# Patient Record
Sex: FEMALE | HISPANIC OR LATINO | Employment: OTHER | URBAN - METROPOLITAN AREA
[De-identification: names, ages, dates, MRNs, and addresses within clinical notes are randomized per-mention and may not be internally consistent; named-entity substitution may affect disease eponyms.]

---

## 2023-01-10 ENCOUNTER — HOSPITAL ENCOUNTER (EMERGENCY)
Facility: CLINIC | Age: 73
Discharge: HOME | End: 2023-01-10
Attending: EMERGENCY MEDICINE | Admitting: EMERGENCY MEDICINE
Payer: COMMERCIAL

## 2023-01-10 VITALS
HEART RATE: 72 BPM | SYSTOLIC BLOOD PRESSURE: 150 MMHG | OXYGEN SATURATION: 99 % | DIASTOLIC BLOOD PRESSURE: 76 MMHG | RESPIRATION RATE: 16 BRPM | TEMPERATURE: 98.6 F

## 2023-01-10 DIAGNOSIS — H43.12 VITREOUS HEMORRHAGE, LEFT EYE (H): ICD-10-CM

## 2023-01-10 PROCEDURE — 99283 EMERGENCY DEPT VISIT LOW MDM: CPT | Performed by: EMERGENCY MEDICINE

## 2023-01-10 PROCEDURE — 99282 EMERGENCY DEPT VISIT SF MDM: CPT | Performed by: EMERGENCY MEDICINE

## 2023-01-10 ASSESSMENT — ACTIVITIES OF DAILY LIVING (ADL): ADLS_ACUITY_SCORE: 35

## 2023-01-11 ENCOUNTER — MEDICAL CORRESPONDENCE (OUTPATIENT)
Dept: HEALTH INFORMATION MANAGEMENT | Facility: CLINIC | Age: 73
End: 2023-01-11

## 2023-01-11 ENCOUNTER — OFFICE VISIT (OUTPATIENT)
Dept: OPHTHALMOLOGY | Facility: CLINIC | Age: 73
End: 2023-01-11
Attending: OPHTHALMOLOGY
Payer: COMMERCIAL

## 2023-01-11 ENCOUNTER — TELEPHONE (OUTPATIENT)
Dept: OPHTHALMOLOGY | Facility: CLINIC | Age: 73
End: 2023-01-11

## 2023-01-11 ENCOUNTER — TRANSFERRED RECORDS (OUTPATIENT)
Dept: OPHTHALMOLOGY | Facility: CLINIC | Age: 73
End: 2023-01-11

## 2023-01-11 ENCOUNTER — HOSPITAL ENCOUNTER (OUTPATIENT)
Facility: AMBULATORY SURGERY CENTER | Age: 73
End: 2023-01-11
Attending: OPHTHALMOLOGY
Payer: COMMERCIAL

## 2023-01-11 DIAGNOSIS — H43.12 VITREOUS HEMORRHAGE OF LEFT EYE (H): Primary | ICD-10-CM

## 2023-01-11 DIAGNOSIS — H43.12 VITREOUS HEMORRHAGE OF LEFT EYE (H): ICD-10-CM

## 2023-01-11 PROBLEM — E11.69 DIABETES MELLITUS TYPE 2 IN OBESE: Status: ACTIVE | Noted: 2021-02-16

## 2023-01-11 PROBLEM — M17.9 OA (OSTEOARTHRITIS) OF KNEE: Status: ACTIVE | Noted: 2020-01-13

## 2023-01-11 PROBLEM — E66.9 DIABETES MELLITUS TYPE 2 IN OBESE: Status: ACTIVE | Noted: 2021-02-16

## 2023-01-11 PROBLEM — H34.8392 BRVO (BRANCH RETINAL VEIN OCCLUSION) (H): Status: ACTIVE | Noted: 2017-05-02

## 2023-01-11 PROCEDURE — G0463 HOSPITAL OUTPT CLINIC VISIT: HCPCS | Mod: 25

## 2023-01-11 PROCEDURE — 76512 OPH US DX B-SCAN: CPT | Performed by: OPHTHALMOLOGY

## 2023-01-11 PROCEDURE — 99204 OFFICE O/P NEW MOD 45 MIN: CPT | Mod: GC | Performed by: OPHTHALMOLOGY

## 2023-01-11 RX ORDER — RIVAROXABAN 20 MG/1
TABLET, FILM COATED ORAL
COMMUNITY
Start: 2022-12-30

## 2023-01-11 ASSESSMENT — CONF VISUAL FIELD
METHOD: COUNTING FINGERS
OD_INFERIOR_TEMPORAL_RESTRICTION: 0
OD_NORMAL: 1
OD_SUPERIOR_TEMPORAL_RESTRICTION: 0
OD_INFERIOR_NASAL_RESTRICTION: 0
OD_SUPERIOR_NASAL_RESTRICTION: 0
OS_INFERIOR_NASAL_RESTRICTION: 3
OS_SUPERIOR_NASAL_RESTRICTION: 3

## 2023-01-11 ASSESSMENT — EXTERNAL EXAM - LEFT EYE: OS_EXAM: NORMAL

## 2023-01-11 ASSESSMENT — VISUAL ACUITY
CORRECTION_TYPE: GLASSES
OD_CC: 20/40
OS_CC: CF @3 FT
OD_CC+: +1
METHOD: SNELLEN - LINEAR

## 2023-01-11 ASSESSMENT — EXTERNAL EXAM - RIGHT EYE: OD_EXAM: NORMAL

## 2023-01-11 ASSESSMENT — TONOMETRY
OD_IOP_MMHG: 13
OS_IOP_MMHG: 15
IOP_METHOD: TONOPEN

## 2023-01-11 ASSESSMENT — CUP TO DISC RATIO: OD_RATIO: 0.4

## 2023-01-11 ASSESSMENT — SLIT LAMP EXAM - LIDS
COMMENTS: NORMAL
COMMENTS: NORMAL

## 2023-01-11 ASSESSMENT — ENCOUNTER SYMPTOMS: JOINT SWELLING: 1

## 2023-01-11 NOTE — CONSULTS
OPHTHALMOLOGY CONSULT NOTE  01/10/23    Patient: Dottie Wilhelm      ASSESSMENT/PLAN:     Dottie Wilhelm is a 72 year old female who presented with sudden decreased vision left eye:     # Vitreous Hemorrhage, left eye  - VA CF 3', no APD  - B scan revealed vitreous hemorrhage with likely superior bleeding vessel, no clear retinal detachment  - DDx: right eye with no evidence of DR so PDR unlikely, does have a h/o BRVO left eye in the past - possibly NVE, most likely vitreous hemorrhage due to acute PVD with ?associated retinal tear  - follow-up retina clinic tomorrow for repeat exam    #H/o BRVO left eye c/b macular edema   - s/p 1 intravitreal injection left eye  - baseline VA unknown - records unavailable    # H/o DM  - controlled on diet - last A1c in 6-7 range  - no DR right eye     # Cataracts each eye   - follows with outside ophthalmologist     It is our pleasure to participate in this patient's care and treatment. Please contact us with any further questions or concerns.    Discussed with Dr Leonard who agreed with this assessment and plan.     Vannessa Mackenzie MD     HISTORY OF PRESENTING ILLNESS:     Dottie Wilhelm is a 72 year old female who presented on 01/10/23 with sudden decreased vision left eye this morning. Possibly had associated flashes this morning but she doesn't know for sure. Initially seen at outside ED transferred here for further care.    No curtain coming down on vision, GCA ROS negative.       10+ review of systems were otherwise negative except for that which has been stated above.      OCULAR/MEDICAL/SURGICAL HISTORIES:     Past Ocular History:   2017 - BRVO with macular edema left eye s/p 1 injection  2018 - floaters left eye     Eye Drops:   None    Pertinent Systemic Medications:   Xarelto (started 5 years ago)    Past Medical History:  Pulmonary embolism 1975  H/o osteomeylitis right thigh 1975  H/o breast cancer 6/2011  DVT 12/2016    Past Surgical History:   Cholecystectomy  1970  Inferior vena cava ligation and left illiofemoral venous thrombectomy  Phlebitis    Family History:  Sister with congenital glaucoma    Social History:  Born in [facility]    EXAMINATION:     Base Eye Exam     Visual Acuity (Snellen - Linear)       Right Left    Dist cc 20/20 CF 3'          Tonometry (Tonopen, 9:13 PM)       Right Left    Pressure 20 21          Pupils       Pupils APD    Right PERRL None    Left PERRL None          Visual Fields (Counting fingers)       Left Right     Full Full          Extraocular Movement       Right Left     Full, Ortho Full, Ortho          Neuro/Psych     Oriented x3: Yes    Mood/Affect: Normal          Dilation     Both eyes: 1.0% Mydriacyl, 2.5% Jewel Synephrine @ 9:12 PM            Additional Tests     Color       Right Left    Ishihara 11/11 UA            Slit Lamp and Fundus Exam     External Exam       Right Left    External Normal Normal          Slit Lamp Exam       Right Left    Lids/Lashes Normal Normal    Conjunctiva/Sclera White and quiet White and quiet    Cornea Clear Clear    Anterior Chamber Deep and quiet Deep and quiet    Iris Round and reactive Round and reactive    Lens NS NS    Anterior Vitreous Normal 3+ Vitreous hemorrhage          Fundus Exam       Right Left    Disc Normal no view    C/D Ratio 0.4     Macula Normal     Vessels Normal     Periphery Normal                 Labs/Studies/Imaging Performed  B scan:  - vit heme with likely hemorrhage coming from superior vessel, no obvious RD       Vannessa Mackenzie MD  Resident Physician, PGY-3  Department of Ophthalmology  01/10/23 9:06 PM   Pager: [phone]

## 2023-01-11 NOTE — DISCHARGE INSTRUCTIONS
Follow-up with the eye clinic tomorrow.    Eye Clinic (phone: [phone]).    Return to the emergency department if any concerns.

## 2023-01-11 NOTE — ED NOTES
Bed: ED04  Expected date:   Expected time:   Means of arrival:   Comments:  Hold for HW D when clean

## 2023-01-11 NOTE — ED NOTES
Bed: IN01  Expected date:   Expected time:   Means of arrival:   Comments:  [id number]- Senait Boone 73 y/o female- left retinal detachment from [facility].     Chandu Sotelo MD  01/1950

## 2023-01-11 NOTE — ED PROVIDER NOTES
ED Provider Note  [facility]      History     Chief Complaint   Patient presents with     Eye Problem     Woke up with blurry vision. Seen at [place]. Transferred here for eye evaluation. Reports has diagnosis of macular degeneration and has seen floaters and webs in her vision in the past.     HPI  Dottie Wilhelm is a 72 year old female who presents to the [facility] emergency department via EMS as a transfer from [place] emergency department where she was seen for decreased vision in her left eye that began this morning.  She was found to have decreased visual acuity in her left eye and only able to see movement.  The patient had an ocular ultrasound performed which was concerning for retinal detachment.  After discussion with ophthalmology here, patient was transferred to the [facility] emergency department for specialty care.  Patient denies any difficulty with her right eye.  No weakness.  No speech difficulty.  No difficulty talking.  No headache.    Past Medical History  Past Medical History:   Diagnosis Date     Arthritis      Breast cancer (H)      Diabetes mellitus (H) 2019     Phlebitis      Pulmonary embolism (H)      No past surgical history on file.  XARELTO ANTICOAGULANT 20 MG TABS tablet      Allergies   Allergen Reactions     Oxycodone Rash     Penicillins Rash     Family History  Family History   Problem Relation Age of Onset     Glaucoma Sister      Social History   Social History     Tobacco Use     Smoking status: Former     Types: Cigarettes     Quit date:      Years since quittin.0     Smokeless tobacco: Never         A medically appropriate review of systems was performed with pertinent positives and negatives noted in the HPI, and all other systems negative.    Physical Exam   BP: (!) 150/76  Pulse: 72  Temp: 98.6  F (37  C)  Resp: 16  SpO2: 99 %  Physical Exam  Vitals and nursing note reviewed.   Constitutional:       General: She is not in acute  distress.     Appearance: Normal appearance. She is not diaphoretic.   HENT:      Head: Normocephalic and atraumatic.      Mouth/Throat:      Pharynx: No oropharyngeal exudate.   Eyes:      General: No scleral icterus.     Comments: Visual field full in right eye.   Cardiovascular:      Rate and Rhythm: Normal rate and regular rhythm.      Pulses: Normal pulses.      Heart sounds: Normal heart sounds.   Pulmonary:      Effort: No respiratory distress.      Breath sounds: Normal breath sounds.   Abdominal:      General: Bowel sounds are normal.      Palpations: Abdomen is soft.      Tenderness: There is no abdominal tenderness.   Musculoskeletal:         General: No tenderness. Normal range of motion.   Skin:     General: Skin is warm.      Capillary Refill: Capillary refill takes less than 2 seconds.      Findings: No rash.   Neurological:      General: No focal deficit present.      Mental Status: She is alert.      Cranial Nerves: No cranial nerve deficit.      Motor: No weakness.      Coordination: Coordination normal.   Psychiatric:         Mood and Affect: Mood normal.           ED Course, Procedures, & Data      Procedures                      No results found for any visits on 01/10/23.  Medications - No data to display  Labs Ordered and Resulted from Time of ED Arrival to Time of ED Departure - No data to display  No orders to display          Medical Decision Making  The patient presented with a problem that is an acute health issue posing potential threat to life or bodily function.    The patient's evaluation involved:  review of 1 prior external note(s) (see separate area of note for details)  discussion of management or test interpretation with another health professional (Ophthalmology)    The patient's management involved only simple and very low risk treatment.      Assessment & Plan    72 year old female with history of diabetes to the emergency department from  emergency department  with concern for retinal detachment.  The patient developed decreased vision this morning.  She was seen at [facility] emergency department ultrasound was performed and was concerning for retinal detachment.  After discussion with ophthalmology, the patient was referred to the [facility] emergency department for ophthalmologic consultation.  She does not have any visual cut on her right eye exam.  Her neurologic examination is otherwise unremarkable.  Do not suspect central neurologic cause for her decreased vision in her left eye.  She was seen by ophthalmology who does not appreciate any evidence for retinal detachment.  She does have vitreous hemorrhage on exam.  Patient will be discharged home.  She will follow-up in [facility] tomorrow for further evaluation and management.    I have reviewed the nursing notes. I have reviewed the findings, diagnosis, plan and need for follow up with the patient.    There are no discharge medications for this patient.      Final diagnoses:   Vitreous hemorrhage, left eye (H)     Chart documentation was completed with Dragon voice-recognition software. Even though reviewed, this chart may still contain some grammatical, spelling, and word errors.     Chandu Sotelo Md  [facility] EMERGENCY DEPARTMENT  1/10/2023     Chandu Sotelo MD  01/11/23 [id number]

## 2023-01-11 NOTE — TELEPHONE ENCOUNTER
Vitreous hemorrhage noted by eye provider at ED visit today    Dr. Dyer able to see today per recommendations to f/u today by on call eye provider.    Spoke to pt couple times this morning    Tentatively scheduled at 10 AM with Dr. Dyer on the 9th floor PWB location    Provided pt with date/time/location and direct number to call to confirm if able to make appt/secure transportation.    Oh Reed RN 8:45 AM 01/11/23              [facility]    Phone Message    May a detailed message be left on voicemail: yes     Reason for Call: Other: Pt was seen in the ED last night for retinal detachment and was told she would need surgery. She was seen at [facility]; recs in [facility]. Pt calling in to schedule ED f/u; please call pt to discuss. Thank you.     Action Taken: Message routed to:  [facility] EYE    Travel Screening: Not Applicable

## 2023-01-11 NOTE — TELEPHONE ENCOUNTER
I called Dottie to schedule surgery with Dr. Sheyla Dyer, I left a voicemail with callback # [phone]     Dottie called back and we finished scheduling for next Tuesday, January 17th.    Patient is scheduled for surgery with Dr. Sheyla Dyer     Spoke with: Dottie     Date of Surgery: 01/17/23     Location: [facility]:  [address] [place] [address]     H&P was completed at: ER visit 01/10     Post Op scheduled on 1/18, 1/25, and 2/15     Surgery packet was emailed to [email or web address]     Additional comments: Advised RN will call 1 - 3 business days prior with arrival time and instructions. Informed patient they will need an adult  and responsible adult to stay with for 24 hours following surgery

## 2023-01-11 NOTE — PROGRESS NOTES
CC -  vision loss, left eye    INTERVAL HISTORY - Initial visit    PMH -   Dottie Wilhelm is a 72 year old female here for evaluation of left eye vision loss. She was in the ED last night and was referred to us.       PAST MEDICAL HISTORY:  DM2 - controlled w/ diet/excercise  PE (1975)  DVT (2016) - on Xarelto   H/o osteomyelitis, left leg (1977)  Breast cancer s/p radiation/chemo 2011      PAST OCULAR SURGERY:  None, no LASIK      RETINAL IMAGING:    B-scan 01/11/23  OS: retina is attached, PVD present, sub-hyaloid heme, no breaks visualized    ASSESSMENT & PLAN    # VH, left eye   - onset 1/10/23   - dense VH present   - suspect due to untreated RVO     - discuss with PCP re: stopping Xarelto perioperatively     - recommend 25 g PPV/EL/possible gas/oil + Avastin    I discussed the risks, benefits, and alternatives of retina surgery with the patient.  I explained that if a retinal detachment is found in surgery there was approximately a 70 to 80 percent chance of success and that the surgery might fail due to formation of fibrosis or other postoperative problems.  I explained that if the surgery failed, additional surgeries might be needed. I explained that retinal detachments cause vision loss and that even with a successful result from the surgery, the vision might not return to its prior level.  I explained that if there were subsequent re-detachments, even more vision might be lost.  I explained that with a gas bubble in the eye, a particular head position after surgery including face-down might be necessary for a few weeks after surgery.  I also explained that airplane travel or high altitudes would have to be avoided for up to three months after surgery.  I explained that an oil bubble could be placed instead, and that it would not require such strict positioning or travel restrictions. However, an oil bubble would require further surgeries to be removed.    I explained that the surgery would accelerate the  development of their cataract and that they would need cataract surgery much sooner than they would otherwise have needed it.  I explained there was a small chance I might have to remove their lens during my surgery. After explaining all risks, benefits and alternatives of the surgery including but not limited to endophthalmitis, retina detachment and cataract the patient elected to proceed.        Risks discussed 1:1000 risk of infection/bleed/loss of eye; 1:100 risk of RD and need for further surgery.Patient aware of prolonged healing after retinal surgery (up to a year after surgery) as well as possibility of air/gas/SO  instillation into eye. Patient agreed to proceed with surgery.      # H/o RVO left   - diagnosed 5/2017   - received one intravitreal injection    # DM2   - diagnosed 2018   - no BDR of the right eye   - A1c controlled (<7)    # Cataract OU   - VS in each eye   - will discuss after surgery    return to clinic: post-op day 1 and week 1    Lee Paulson MD  Retina Fellow, PGY6    ~~~~~~~~~~~~~~~~~~~~~~~~~~~~~~~~~~   Complete documentation of historical and exam elements from today's encounter can be found in the full encounter summary report (not reduplicated in this progress note).  I personally obtained the chief complaint(s) and history of present illness.  I confirmed and edited as necessary the review of systems, past medical/surgical history, family history, social history, and examination findings as documented by others; and I examined the patient myself.  I personally reviewed the relevant tests, images, and reports as documented above.  I personally reviewed the ophthalmic test(s) associated with this encounter, agree with the interpretation(s) as documented by the resident/fellow, and have edited the corresponding report(s) as necessary.   I formulated and edited as necessary the assessment and plan and discussed the findings and management plan with the patient and family    Sheyla ROJAS  MD Manisha  Professor of Ophthalmology  Vitreo-Retinal surgeon   Department of Ophthalmology and Visual Neurosciences   [facility]  Phone: [phone]   Fax: [phone]

## 2023-01-11 NOTE — NURSING NOTE
"Chief Complaints and History of Present Illnesses   Patient presents with     Follow Up     Vitreous hemorrhage, left eye      Chief Complaint(s) and History of Present Illness(es)     Follow Up            Laterality: left eye    Course: gradually worsening    Associated symptoms: eye pain, headache, flashes and floaters    Pain scale: 9/10    Comments: Vitreous hemorrhage, left eye           Comments    Patient was seen int he ED yesterday for a vitreous hemorrhage in her left eye.  The issue started yesterday morning, when she woke she was seeing web like floaters in her left eye along with flashing.  She tells me that she is having a lot of frustration and anxiety about her lost vision, icy conditions and the two ED visits.  When I ask if she is having eye pain she tells me \"at this point everything hurts\".  She was unable to sleep last night as she was was unsure if she could take anything for her pain.    Lina Lua, COT 11:10 AM  January 11, 2023                      "

## 2023-01-11 NOTE — ED TRIAGE NOTES
Triage Assessment     Row Name 01/10/23 2007       Triage Assessment (Adult)    Airway WDL WDL       Respiratory WDL    Respiratory WDL WDL       Skin Circulation/Temperature WDL    Skin Circulation/Temperature WDL WDL       Cardiac WDL    Cardiac WDL WDL       Peripheral/Neurovascular WDL    Peripheral Neurovascular WDL WDL       Cognitive/Neuro/Behavioral WDL    Cognitive/Neuro/Behavioral WDL WDL

## 2023-01-12 ENCOUNTER — TELEPHONE (OUTPATIENT)
Dept: OPHTHALMOLOGY | Facility: CLINIC | Age: 73
End: 2023-01-12

## 2023-01-12 NOTE — TELEPHONE ENCOUNTER
Dottie called for help getting her surgery information from [employer] and activating the MyChart as well.     Provided the correct login information and set password and user name. I emailed the information to Dottie and she was able to finally connect to her [facility].    I also emailed the surgery information to Dottie as her MyChart had not been actived yet when I started putting the information together this morning.

## 2023-01-13 RX ORDER — CYCLOPENTOLAT/TROPIC/PHENYLEPH 1%-1%-2.5%
1 DROPS (EA) OPHTHALMIC (EYE)
Status: CANCELLED | OUTPATIENT
Start: 2023-01-13

## 2023-01-13 RX ORDER — PROPARACAINE HYDROCHLORIDE 5 MG/ML
1 SOLUTION/ DROPS OPHTHALMIC ONCE
Status: CANCELLED | OUTPATIENT
Start: 2023-01-13 | End: 2023-01-13

## 2023-01-16 RX ORDER — FENTANYL CITRATE 50 UG/ML
50 INJECTION, SOLUTION INTRAMUSCULAR; INTRAVENOUS EVERY 5 MIN PRN
Status: CANCELLED | OUTPATIENT
Start: 2023-01-16

## 2023-01-16 RX ORDER — ONDANSETRON 2 MG/ML
4 INJECTION INTRAMUSCULAR; INTRAVENOUS EVERY 30 MIN PRN
Status: CANCELLED | OUTPATIENT
Start: 2023-01-16

## 2023-01-16 RX ORDER — SODIUM CHLORIDE, SODIUM LACTATE, POTASSIUM CHLORIDE, CALCIUM CHLORIDE 600; 310; 30; 20 MG/100ML; MG/100ML; MG/100ML; MG/100ML
INJECTION, SOLUTION INTRAVENOUS CONTINUOUS
Status: CANCELLED | OUTPATIENT
Start: 2023-01-16

## 2023-01-16 RX ORDER — MEPERIDINE HYDROCHLORIDE 25 MG/ML
12.5 INJECTION INTRAMUSCULAR; INTRAVENOUS; SUBCUTANEOUS
Status: CANCELLED | OUTPATIENT
Start: 2023-01-16

## 2023-01-16 RX ORDER — FENTANYL CITRATE 50 UG/ML
25 INJECTION, SOLUTION INTRAMUSCULAR; INTRAVENOUS EVERY 5 MIN PRN
Status: CANCELLED | OUTPATIENT
Start: 2023-01-16

## 2023-01-16 RX ORDER — ACETAMINOPHEN 325 MG/1
975 TABLET ORAL ONCE
Status: CANCELLED | OUTPATIENT
Start: 2023-01-16 | End: 2023-01-16

## 2023-01-16 RX ORDER — LABETALOL HYDROCHLORIDE 5 MG/ML
10 INJECTION, SOLUTION INTRAVENOUS
Status: CANCELLED | OUTPATIENT
Start: 2023-01-16

## 2023-01-16 RX ORDER — ONDANSETRON 4 MG/1
4 TABLET, ORALLY DISINTEGRATING ORAL EVERY 30 MIN PRN
Status: CANCELLED | OUTPATIENT
Start: 2023-01-16

## 2023-01-16 RX ORDER — FENTANYL CITRATE 50 UG/ML
25 INJECTION, SOLUTION INTRAMUSCULAR; INTRAVENOUS
Status: CANCELLED | OUTPATIENT
Start: 2023-01-16

## 2023-01-16 RX ORDER — LIDOCAINE 40 MG/G
CREAM TOPICAL
Status: CANCELLED | OUTPATIENT
Start: 2023-01-16

## 2023-01-18 ENCOUNTER — OFFICE VISIT (OUTPATIENT)
Dept: OPHTHALMOLOGY | Facility: CLINIC | Age: 73
End: 2023-01-18
Attending: OPHTHALMOLOGY
Payer: COMMERCIAL

## 2023-01-18 DIAGNOSIS — Z48.810 AFTERCARE FOLLOWING SURGERY OF A SENSORY ORGAN: Primary | ICD-10-CM

## 2023-01-18 PROCEDURE — G0463 HOSPITAL OUTPT CLINIC VISIT: HCPCS

## 2023-01-18 PROCEDURE — 99024 POSTOP FOLLOW-UP VISIT: CPT | Performed by: OPHTHALMOLOGY

## 2023-01-18 ASSESSMENT — SLIT LAMP EXAM - LIDS
COMMENTS: NORMAL
COMMENTS: NORMAL

## 2023-01-18 ASSESSMENT — TONOMETRY
IOP_METHOD: TONOPEN
OD_IOP_MMHG: 14
OS_IOP_MMHG: 10

## 2023-01-18 ASSESSMENT — VISUAL ACUITY
OS_CC+: -2
OS_CC: 20/60
METHOD: SNELLEN - LINEAR
OS_PH_CC: 20/40
OD_CC: 20/25
OS_PH_CC+: -2

## 2023-01-18 ASSESSMENT — REFRACTION_WEARINGRX
OS_CYLINDER: +0.75
OD_CYLINDER: +0.25
SPECS_TYPE: PAL
OS_ADD: +2.75
OD_SPHERE: +1.50
OS_SPHERE: +1.25
OD_ADD: +2.75
OD_AXIS: 009
OS_AXIS: 062

## 2023-01-18 ASSESSMENT — EXTERNAL EXAM - RIGHT EYE: OD_EXAM: NORMAL

## 2023-01-18 ASSESSMENT — CUP TO DISC RATIO: OD_RATIO: 0.4

## 2023-01-18 ASSESSMENT — EXTERNAL EXAM - LEFT EYE: OS_EXAM: NORMAL

## 2023-01-18 NOTE — NURSING NOTE
Chief Complaints and History of Present Illnesses   Patient presents with     Post Op (Ophthalmology) Left Eye     Chief Complaint(s) and History of Present Illness(es)     Post Op (Ophthalmology) Left Eye            Laterality: left eye    Onset: 1 day ago    Pain scale: 3/10          Comments    1 day s/p 25G PPV/Endolaser/air fluid exchange/Intravitreal avastin injection LE 01/17/2023-Pt. States that she has been having some mild pain LE since surgery. Tylenol has been managing pain well. Was able to sleep well.  Meri Ortez COT 9:25 AM January 18, 2023

## 2023-01-18 NOTE — PATIENT INSTRUCTIONS
Plan:  Position: not on back  No aviation  No heavy lifting   Diez shield at all times  Retina detachment and endophthalmitis precautions were discussed with the patient and was asked to return if any of the those occur    Medications to operative eye  Moxifloxacin four times a day    Predforte  Four times a day      Follow up in one week  
I certify as stated above.

## 2023-01-18 NOTE — PROGRESS NOTES
CC -  pod1 status post 25 g Pars plana vitrectomy (PPV)/ endolaser/ air fluid exchange left eye 01/17/23   Slept well  Improving vision  No eye pain  PMH - Dottie Wilhelm is a 72 year old female here for evaluation of left eye vision loss. She was in the ED last night and was referred to us.     PAST MEDICAL HISTORY:  DM2 - controlled w/ diet/excercise  PE (1975)  DVT (2016) - on Xarelto   H/o osteomyelitis, left leg (1977)  Breast cancer s/p radiation/chemo 2011      PAST OCULAR SURGERY: None, no LASIK  RETINAL IMAGING: B-scan 01/11/23  OS: retina is attached, PVD present, sub-hyaloid heme, no breaks visualized    ASSESSMENT & PLAN    #Postoperative day 1 status post 25 g Pars plana vitrectomy (PPV)/ endolaser/ air fluid exchange left eye 01/17/23   Secondary to  To vitreous hemorrhage - RVO   Slept well  Retina attached  Doing well    # H/o RVO left   - diagnosed 5/2017   - received one intravitreal injection    # DM2   - diagnosed 2018   - no BDR of the right eye   - A1c controlled (<7)    # Cataract OU   - VS in each eye   - will discuss after surgery    Plan:  Position: not on back  No aviation  No heavy lifting   Diez shield at all times  Retina detachment and endophthalmitis precautions were discussed with the patient and was asked to return if any of the those occur    Medications to operative eye  Moxifloxacin four times a day    Predforte  Four times a day      Follow up in one week  ~~~~~~~~~~~~~~~~~~~~~~~~~~~~~~~~~~   Complete documentation of historical and exam elements from today's encounter can be found in the full encounter summary report (not reduplicated in this progress note).  I personally obtained the chief complaint(s) and history of present illness.  I confirmed and edited as necessary the review of systems, past medical/surgical history, family history, social history, and examination findings as documented by others; and I examined the patient myself.  I personally reviewed the relevant  tests, images, and reports as documented above.  I formulated and edited as necessary the assessment and plan and discussed the findings and management plan with the patient and family    Sheyla Dyer MD  Professor of Ophthalmology  Vitreo-Retinal surgeon   Department of Ophthalmology and Visual Neurosciences   [facility]  Phone: [phone]   Fax: [phone]

## 2023-01-25 ENCOUNTER — OFFICE VISIT (OUTPATIENT)
Dept: OPHTHALMOLOGY | Facility: CLINIC | Age: 73
End: 2023-01-25
Attending: OPHTHALMOLOGY
Payer: COMMERCIAL

## 2023-01-25 DIAGNOSIS — Z48.810 AFTERCARE FOLLOWING SURGERY OF A SENSORY ORGAN: Primary | ICD-10-CM

## 2023-01-25 PROCEDURE — 99024 POSTOP FOLLOW-UP VISIT: CPT | Performed by: OPHTHALMOLOGY

## 2023-01-25 PROCEDURE — G0463 HOSPITAL OUTPT CLINIC VISIT: HCPCS

## 2023-01-25 ASSESSMENT — VISUAL ACUITY
OD_CC: 20/30
CORRECTION_TYPE: GLASSES
OD_PH_CC: 20/20
OS_PH_CC: 20/25
METHOD: SNELLEN - LINEAR
OS_CC: 20/30-2

## 2023-01-25 ASSESSMENT — REFRACTION_WEARINGRX
SPECS_TYPE: PAL
OD_CYLINDER: +0.25
OS_CYLINDER: +0.75
OD_SPHERE: +1.50
OS_AXIS: 062
OS_ADD: +2.75
OD_ADD: +2.75
OS_SPHERE: +1.25
OD_AXIS: 009

## 2023-01-25 ASSESSMENT — TONOMETRY
OD_IOP_MMHG: 10
OS_IOP_MMHG: 12
IOP_METHOD: TONOPEN

## 2023-01-25 ASSESSMENT — SLIT LAMP EXAM - LIDS
COMMENTS: NORMAL
COMMENTS: NORMAL

## 2023-01-25 ASSESSMENT — EXTERNAL EXAM - RIGHT EYE: OD_EXAM: NORMAL

## 2023-01-25 ASSESSMENT — EXTERNAL EXAM - LEFT EYE: OS_EXAM: NORMAL

## 2023-01-25 ASSESSMENT — CUP TO DISC RATIO: OD_RATIO: 0.4

## 2023-01-25 NOTE — PROGRESS NOTES
CC -  pod1 status post 25 g Pars plana vitrectomy (PPV)/ endolaser/ air fluid exchange left eye 01/17/23   Slept well  Improving vision  No eye pain  PMH - Dottie Wilhelm is a 72 year old female here for evaluation of left eye vision loss. She was in the ED last night and was referred to us.     PAST MEDICAL HISTORY:  DM2 - controlled w/ diet/excercise  PE (1975)  DVT (2016) - on Xarelto   H/o osteomyelitis, left leg (1977)  Breast cancer s/p radiation/chemo 2011      PAST OCULAR SURGERY: None, no LASIK  RETINAL IMAGING: B-scan 01/11/23  OS: retina is attached, PVD present, sub-hyaloid heme, no breaks visualized    ASSESSMENT & PLAN    #Postoperative week 1 status post 25 g Pars plana vitrectomy (PPV)/ endolaser/ air fluid exchange left eye 01/17/23   Secondary to  To vitreous hemorrhage - RVO   Slept well  Retina attached  Doing well    # H/o RVO left   - diagnosed 5/2017   - received one intravitreal injection    # DM2   - diagnosed 2018   - no BDR of the right eye   - A1c controlled (<7)    # Cataract OU   - VS in each eye   - will discuss after surgery    Plan:  Position: any   Diez shield at all times  Retina detachment and endophthalmitis precautions were discussed with the patient and was asked to return if any of the those occur    Medications to operative eye  Moxifloxacin ok to stop   Predforte Three times a day x 1 week, then twice a day x 1 week and then once a day till finish  artificial tears  As needed     Follow up in 3 weeks with Optical Coherence Tomography ; BAT testing   ~~~~~~~~~~~~~~~~~~~~~~~~~~~~~~~~~~   Complete documentation of historical and exam elements from today's encounter can be found in the full encounter summary report (not reduplicated in this progress note).  I personally obtained the chief complaint(s) and history of present illness.  I confirmed and edited as necessary the review of systems, past medical/surgical history, family history, social history, and examination findings as  documented by others; and I examined the patient myself.  I personally reviewed the relevant tests, images, and reports as documented above.  I formulated and edited as necessary the assessment and plan and discussed the findings and management plan with the patient and family    Sheyla Dyer MD  Professor of Ophthalmology  Vitreo-Retinal surgeon   Department of Ophthalmology and Visual Neurosciences   [facility]  Phone: [phone]   Fax: [phone]

## 2023-02-01 DIAGNOSIS — H43.12 VITREOUS HEMORRHAGE OF LEFT EYE (H): Primary | ICD-10-CM

## 2023-02-15 ENCOUNTER — OFFICE VISIT (OUTPATIENT)
Dept: OPHTHALMOLOGY | Facility: CLINIC | Age: 73
End: 2023-02-15
Attending: OPHTHALMOLOGY
Payer: COMMERCIAL

## 2023-02-15 DIAGNOSIS — H43.12 VITREOUS HEMORRHAGE OF LEFT EYE (H): ICD-10-CM

## 2023-02-15 PROCEDURE — 99207 OCT RETINA SPECTRALIS OU (BOTH EYE): CPT | Mod: 26 | Performed by: OPHTHALMOLOGY

## 2023-02-15 PROCEDURE — 99024 POSTOP FOLLOW-UP VISIT: CPT | Performed by: OPHTHALMOLOGY

## 2023-02-15 PROCEDURE — 92134 CPTRZ OPH DX IMG PST SGM RTA: CPT | Performed by: OPHTHALMOLOGY

## 2023-02-15 PROCEDURE — G0463 HOSPITAL OUTPT CLINIC VISIT: HCPCS | Mod: 25

## 2023-02-15 RX ORDER — PREDNISOLONE ACETATE 10 MG/ML
1 SUSPENSION/ DROPS OPHTHALMIC
COMMUNITY
Start: 2023-01-17 | End: 2023-04-19

## 2023-02-15 ASSESSMENT — REFRACTION_WEARINGRX
OD_SPHERE: +1.50
SPECS_TYPE: PAL
OS_ADD: +2.75
OS_CYLINDER: +0.75
OS_AXIS: 062
OD_CYLINDER: +0.25
OS_SPHERE: +1.25
OD_ADD: +2.75
OD_AXIS: 009

## 2023-02-15 ASSESSMENT — VISUAL ACUITY
OD_BAT_LOW: 20/40-
OD_CC+: -3
OS_BAT_HIGH: 20/80+1
OD_CC: 20/25
METHOD: SNELLEN - LINEAR
OS_BAT_MED: 20/60
OD_BAT_HIGH: 20/50
OS_CC: 20/30
CORRECTION_TYPE: GLASSES
OS_BAT_LOW: 20/50-2
OD_BAT_MED: 20/70-

## 2023-02-15 ASSESSMENT — TONOMETRY
IOP_METHOD: TONOPEN
OD_IOP_MMHG: 16
OS_IOP_MMHG: 14

## 2023-02-15 ASSESSMENT — SLIT LAMP EXAM - LIDS
COMMENTS: NORMAL
COMMENTS: NORMAL

## 2023-02-15 ASSESSMENT — EXTERNAL EXAM - RIGHT EYE: OD_EXAM: NORMAL

## 2023-02-15 ASSESSMENT — EXTERNAL EXAM - LEFT EYE: OS_EXAM: NORMAL

## 2023-02-15 ASSESSMENT — CUP TO DISC RATIO: OD_RATIO: 0.4

## 2023-02-15 NOTE — NURSING NOTE
Chief Complaints and History of Present Illnesses   Patient presents with     Post Op (Ophthalmology) Left Eye     status post 25 g Pars plana vitrectomy/ endolaser/ air fluid exchange in left eye on 01/17/23      Chief Complaint(s) and History of Present Illness(es)     Post Op (Ophthalmology) Left Eye            Laterality: left eye    Course: stable    Associated symptoms: glare and haloes.  Negative for dryness and eye pain    Treatments tried: eye drops    Pain scale: 0/10    Comments: status post 25 g Pars plana vitrectomy/ endolaser/ air fluid exchange in left eye on 01/17/23           Comments    Patient returns to clinic for a one month post operative exam of her left eye.  She tells me that her vision seems fogged in her left eye.  She is using Prednisolone acetate daily in her left eye.    Lina Lua, COT 2:48 PM  February 15, 2023

## 2023-02-15 NOTE — PROGRESS NOTES
CC -  pom1 status post 25 g Pars plana vitrectomy (PPV)/ endolaser/ air fluid exchange left eye 01/17/23   Interval: Improving vision; No eye pain  PMH - Dottie Wilhelm is a 73 year old female here for evaluation of left eye vision loss. She was in the ED last night and was referred to us.     PAST MEDICAL HISTORY: DM2 - controlled w/ diet/excercise; PE (1975); DVT (2016) - on Xarelto   H/o osteomyelitis, left leg (1977); Breast cancer s/p radiation/chemo 2011  PAST OCULAR SURGERY: None, no laser in situ keratomileusis     RETINAL IMAGING:   Optical Coherence Tomography 02/15/23   Right eye: normal foveal contour  Left eye: good foveal contour; sup retina thinning    ASSESSMENT & PLAN    #Postoperative month 1 status post 25 g Pars plana vitrectomy (PPV)/ endolaser/ air fluid exchange left eye 01/17/23   Secondary to  To vitreous hemorrhage - RVO   Slept well  Retina attached  Doing well    # H/o RVO left   - diagnosed 5/2017   - received one intravitreal injection    # DM2   - diagnosed 2018   - no BDR of the right eye   - A1c controlled (<7)    # Cataract OU   - VS in each eye  Glare Testing (BAT)     Off Low Medium High   Right 20/25-3 20/40- 20/70- 20/50   Left 20/30 20/50-2 20/60 20/80+1      -  Recommend cataract evaluation next visit     Plan:  Stop eyedrops  artificial tears  As needed     Follow up in 2 months cataract evaluation, with intraocular lens calcs; Optical Coherence Tomography and topography; dilate both eyes     ~~~~~~~~~~~~~~~~~~~~~~~~~~~~~~~~~~   Complete documentation of historical and exam elements from today's encounter can be found in the full encounter summary report (not reduplicated in this progress note).  I personally obtained the chief complaint(s) and history of present illness.  I confirmed and edited as necessary the review of systems, past medical/surgical history, family history, social history, and examination findings as documented by others; and I examined the patient myself.   I personally reviewed the relevant tests, images, and reports as documented above.  I formulated and edited as necessary the assessment and plan and discussed the findings and management plan with the patient and family    Sheyla Dyer MD  Professor of Ophthalmology  Vitreo-Retinal surgeon   Department of Ophthalmology and Visual Neurosciences   [facility]  Phone: [phone]   Fax: [phone]

## 2023-02-23 ENCOUNTER — TELEPHONE (OUTPATIENT)
Dept: OPHTHALMOLOGY | Facility: CLINIC | Age: 73
End: 2023-02-23
Payer: COMMERCIAL

## 2023-02-23 NOTE — TELEPHONE ENCOUNTER
Reviewed with patient ok to schedule with Dr. Dyer for cataract surgery pt /    Scheduled 2 month f/u mid April    Pt aware of date/time/location at [facility] and reviewed [facility] senior transport as has been difficult to drive to clinic/park.    Pt seemed comfortable with plan/information    Oh Reed RN 1:44 PM 02/23/23            [facility]    Phone Message    May a detailed message be left on voicemail: yes     Reason for Call: Other: Pt is supposed to schedule a cataracts evaluation in 2 months but she will like to know if the evaluation is supposed to be scheduled with  or with a cataracts provider. Per protocol  doesn't see for cataracts so wirter unsure.    Please verify and advise if eval is to be schedule with . Thank You!     Action Taken: Message routed to:  [facility]    Travel Screening: Not Applicable

## 2023-03-26 ENCOUNTER — HEALTH MAINTENANCE LETTER (OUTPATIENT)
Age: 73
End: 2023-03-26

## 2023-04-11 DIAGNOSIS — H43.12 VITREOUS HEMORRHAGE OF LEFT EYE (H): Primary | ICD-10-CM

## 2023-04-19 ENCOUNTER — OFFICE VISIT (OUTPATIENT)
Dept: OPHTHALMOLOGY | Facility: CLINIC | Age: 73
End: 2023-04-19
Attending: OPHTHALMOLOGY
Payer: COMMERCIAL

## 2023-04-19 ENCOUNTER — TELEPHONE (OUTPATIENT)
Dept: OPHTHALMOLOGY | Facility: CLINIC | Age: 73
End: 2023-04-19

## 2023-04-19 DIAGNOSIS — H52.223 REGULAR ASTIGMATISM OF BOTH EYES: ICD-10-CM

## 2023-04-19 DIAGNOSIS — H25.013 CORTICAL AGE-RELATED CATARACT OF BOTH EYES: Primary | ICD-10-CM

## 2023-04-19 DIAGNOSIS — H34.8322 STABLE BRANCH RETINAL VEIN OCCLUSION OF LEFT EYE (H): ICD-10-CM

## 2023-04-19 DIAGNOSIS — H43.12 VITREOUS HEMORRHAGE OF LEFT EYE (H): ICD-10-CM

## 2023-04-19 PROCEDURE — 99214 OFFICE O/P EST MOD 30 MIN: CPT | Performed by: OPHTHALMOLOGY

## 2023-04-19 PROCEDURE — 92134 CPTRZ OPH DX IMG PST SGM RTA: CPT | Performed by: OPHTHALMOLOGY

## 2023-04-19 PROCEDURE — G0463 HOSPITAL OUTPT CLINIC VISIT: HCPCS | Performed by: OPHTHALMOLOGY

## 2023-04-19 PROCEDURE — 76519 ECHO EXAM OF EYE: CPT | Performed by: OPHTHALMOLOGY

## 2023-04-19 PROCEDURE — 92025 CPTRIZED CORNEAL TOPOGRAPHY: CPT | Performed by: OPHTHALMOLOGY

## 2023-04-19 ASSESSMENT — REFRACTION_WEARINGRX
SPECS_TYPE: PAL
OS_AXIS: 062
OD_AXIS: 009
OD_ADD: +2.75
OS_CYLINDER: +0.75
OS_ADD: +2.75
OD_CYLINDER: +0.25
OS_SPHERE: +1.25
OD_SPHERE: +1.50

## 2023-04-19 ASSESSMENT — TONOMETRY
IOP_METHOD: TONOPEN
OD_IOP_MMHG: 22
OS_IOP_MMHG: 21

## 2023-04-19 ASSESSMENT — REFRACTION_MANIFEST
OS_AXIS: 070
OS_SPHERE: +0.25
OS_CYLINDER: +0.75
OS_ADD: +2.75
OD_ADD: +2.75
OD_CYLINDER: SPHERE
OD_SPHERE: +1.25

## 2023-04-19 ASSESSMENT — EXTERNAL EXAM - LEFT EYE: OS_EXAM: NORMAL

## 2023-04-19 ASSESSMENT — CONF VISUAL FIELD
OS_SUPERIOR_NASAL_RESTRICTION: 0
OD_SUPERIOR_NASAL_RESTRICTION: 0
OD_INFERIOR_TEMPORAL_RESTRICTION: 0
OD_NORMAL: 1
OD_INFERIOR_NASAL_RESTRICTION: 0
OS_NORMAL: 1
METHOD: COUNTING FINGERS
OS_INFERIOR_NASAL_RESTRICTION: 0
OD_SUPERIOR_TEMPORAL_RESTRICTION: 0
OS_INFERIOR_TEMPORAL_RESTRICTION: 0
OS_SUPERIOR_TEMPORAL_RESTRICTION: 0

## 2023-04-19 ASSESSMENT — VISUAL ACUITY
OS_CC+: -1
OS_CC: 20/60
OD_CC+: -2
CORRECTION_TYPE: GLASSES
METHOD: SNELLEN - LINEAR
OD_CC: 20/30

## 2023-04-19 ASSESSMENT — SLIT LAMP EXAM - LIDS
COMMENTS: NORMAL
COMMENTS: NORMAL

## 2023-04-19 ASSESSMENT — CUP TO DISC RATIO: OD_RATIO: 0.4

## 2023-04-19 ASSESSMENT — EXTERNAL EXAM - RIGHT EYE: OD_EXAM: NORMAL

## 2023-04-19 NOTE — NURSING NOTE
Chief Complaints and History of Present Illnesses   Patient presents with     Cataract Evaluation     Chief Complaint(s) and History of Present Illness(es)     Cataract Evaluation            Laterality: both eyes    Associated symptoms: blurred vision, glare and a need for brighter lights    Onset: gradual    Treatments tried: no treatments          Comments    Here for cataracts evaluation. Vision gradually worsening. Glare and night driving is bothersome. Denies using drops.    Roni SANTOYO 12:59 PM April 19, 2023

## 2023-04-19 NOTE — PROGRESS NOTES
CC -  status post 25 g Pars plana vitrectomy (PPV)/ endolaser/ air fluid exchange left eye 01/17/23   Because of  Vitreous hemorrhage and rvo   Interval: Improving vision; No eye pain  PMH - Dottie Wilhelm is a 73 year old female here for evaluation of left eye vision loss. She was in the ED last night and was referred to us.     PAST MEDICAL HISTORY: DM2 - controlled w/ diet/excercise; PE (1975); DVT (2016) - on Xarelto   H/o osteomyelitis, left leg (1977); Breast cancer s/p radiation/chemo 2011  PAST OCULAR SURGERY: None, no laser in situ keratomileusis     RETINAL IMAGING:   Optical Coherence Tomography 02/15/23   Right eye: normal foveal contour  Left eye: good foveal contour; sup and temporal retina thinning    ASSESSMENT & PLAN    #status post 25 g Pars plana vitrectomy (PPV)/ endolaser/ air fluid exchange left eye 01/17/23   Secondary to  To vitreous hemorrhage - RVO   Slept well  Retina attached  Doing well    # H/o RVO left   - diagnosed 5/2017   - received one intravitreal injection    # DM2   - diagnosed 2018   - no BDR of the right eye   - A1c controlled (<7)    # Cataract OU   - VS in each eye  Glare Testing (BAT)     Off Low Medium High   Right 20/25-3 20/40- 20/70- 20/50   Left 20/30 20/50-2 20/60 20/80+1        Plan for Cataract extraction intraocular lens left eye   Surgeon procedure time:  45 min  Urgency of Surgery: Routine  Post-op apps needed: 1day; 1 week; 3 weeks  Multi surgeon case: No   H&P completed by primary care physician/PAC   needed: no   Anesthesia: Peribulbar block    The cataract is visually significant, Reports impacts ADL and has glare   Dilation:Good  Iris expansion: Not needed  Epinephrine: No  Pseudoexfoliation: NO  Trypan Blue: YES   Phacodonesis: No  Cornea guttae: rare  Aim for: plano  Anesthesia: peribulbar block  Anticoagulants: NO  Candidate for multifocal or toric IOL: NO  Visually significant astigmatism: NO    I reviewed the indications, risks, benefits, and  alternatives of the proposed surgical procedure. We discussed at length cataracts and the effect of the cataracts on vision.   We discussed the fact that modern cataract surgery is usually successful at alleviating symptoms of glare when the cataract is the causative factor. Other risks were discussed with patient including, but not limited to, failure to improve vision or further loss of vision, bleeding, infection, loss of vision and the remote possibility of complications of anesthesia or need for additional surgery. 1:1000 risk of infection/bleed/loss of eye; 1:100 risk of RD and need for further surgery.  Patient agreed to proceed with surgery.  I provided multiple opportunities for the questions, answered all questions to the best of my ability, and confirmed that my answers and my discussion were understood.     Patients pre-op recommendations:  No solid food after midnight.  Clear liquids: coffee (no cream), tea, water, clear juices (no pulp), jello are OK before 6 A.M.  You may take your regular pills with this.  If you are taking glaucoma drops, continue as usual.        ~~~~~~~~~~~~~~~~~~~~~~~~~~~~~~~~~~   Complete documentation of historical and exam elements from today's encounter can be found in the full encounter summary report (not reduplicated in this progress note).  I personally obtained the chief complaint(s) and history of present illness.  I confirmed and edited as necessary the review of systems, past medical/surgical history, family history, social history, and examination findings as documented by others; and I examined the patient myself.  I personally reviewed the relevant tests, images, and reports as documented above.  I formulated and edited as necessary the assessment and plan and discussed the findings and management plan with the patient and family    Sheyla Dyer MD  Professor of Ophthalmology  Vitreo-Retinal surgeon   Department of Ophthalmology and Visual Neurosciences    [facility]  Phone: [phone]   Fax: [phone]

## 2023-04-19 NOTE — TELEPHONE ENCOUNTER
Patient is scheduled for surgery with Dr. Sheyla Dyer     Spoke with: Dottie     Date of Surgery: 04/25/23     Location: [facility]     H&P will be completed at: [facility] 04/20 @ 9:45AM    Post Op scheduled on 04/26, 05/03, and 05/24     Surgery packet was mailed 04/20     Additional comments: Advised RN will call 1 - 3 business days prior with arrival time and instructions. Informed patient they will need an adult  and responsible adult to stay with for 24 hours following surgery

## 2023-04-24 ENCOUNTER — ANESTHESIA EVENT (OUTPATIENT)
Dept: SURGERY | Facility: AMBULATORY SURGERY CENTER | Age: 73
End: 2023-04-24
Payer: COMMERCIAL

## 2023-04-24 RX ORDER — OXYCODONE HYDROCHLORIDE 5 MG/1
10 TABLET ORAL
Status: CANCELLED | OUTPATIENT
Start: 2023-04-24

## 2023-04-24 RX ORDER — OXYCODONE HYDROCHLORIDE 5 MG/1
5 TABLET ORAL
Status: CANCELLED | OUTPATIENT
Start: 2023-04-24

## 2023-04-25 ENCOUNTER — ANESTHESIA (OUTPATIENT)
Dept: SURGERY | Facility: AMBULATORY SURGERY CENTER | Age: 73
End: 2023-04-25
Payer: COMMERCIAL

## 2023-04-25 ENCOUNTER — HOSPITAL ENCOUNTER (OUTPATIENT)
Facility: AMBULATORY SURGERY CENTER | Age: 73
Discharge: HOME | End: 2023-04-25
Attending: OPHTHALMOLOGY
Payer: COMMERCIAL

## 2023-04-25 VITALS
BODY MASS INDEX: 31.54 KG/M2 | DIASTOLIC BLOOD PRESSURE: 62 MMHG | HEART RATE: 70 BPM | TEMPERATURE: 96.8 F | HEIGHT: 63 IN | SYSTOLIC BLOOD PRESSURE: 126 MMHG | OXYGEN SATURATION: 97 % | WEIGHT: 178 LBS | RESPIRATION RATE: 16 BRPM

## 2023-04-25 DIAGNOSIS — Z98.890 POSTOPERATIVE EYE STATE: Primary | ICD-10-CM

## 2023-04-25 DIAGNOSIS — H25.013 CORTICAL AGE-RELATED CATARACT OF BOTH EYES: ICD-10-CM

## 2023-04-25 LAB
GLUCOSE BLDC GLUCOMTR-MCNC: 131 MG/DL (ref 70–99)
GLUCOSE BLDC GLUCOMTR-MCNC: 169 MG/DL (ref 70–99)

## 2023-04-25 PROCEDURE — 66984 XCAPSL CTRC RMVL W/O ECP: CPT | Mod: LT

## 2023-04-25 PROCEDURE — 82962 GLUCOSE BLOOD TEST: CPT | Performed by: PATHOLOGY

## 2023-04-25 PROCEDURE — 66984 XCAPSL CTRC RMVL W/O ECP: CPT | Mod: LT | Performed by: OPHTHALMOLOGY

## 2023-04-25 DEVICE — LENS CC60WF 21.0 CLAREON UV ASPHERIC BICONVEX IOL: Type: IMPLANTABLE DEVICE | Site: EYE | Status: FUNCTIONAL

## 2023-04-25 RX ORDER — LIDOCAINE 40 MG/G
CREAM TOPICAL
Status: DISCONTINUED | OUTPATIENT
Start: 2023-04-25 | End: 2023-04-26 | Stop reason: HOSPADM

## 2023-04-25 RX ORDER — TETRACAINE HYDROCHLORIDE 5 MG/ML
SOLUTION OPHTHALMIC PRN
Status: DISCONTINUED | OUTPATIENT
Start: 2023-04-25 | End: 2023-04-25 | Stop reason: HOSPADM

## 2023-04-25 RX ORDER — OFLOXACIN 3 MG/ML
1 SOLUTION/ DROPS OPHTHALMIC 4 TIMES DAILY
Qty: 5 ML | Refills: 0 | Status: SHIPPED | OUTPATIENT
Start: 2023-04-25 | End: 2023-09-07

## 2023-04-25 RX ORDER — KETOROLAC TROMETHAMINE 5 MG/ML
1 SOLUTION OPHTHALMIC 4 TIMES DAILY
Qty: 5 ML | Refills: 0 | Status: SHIPPED | OUTPATIENT
Start: 2023-04-25 | End: 2023-09-07

## 2023-04-25 RX ORDER — ACETAMINOPHEN 325 MG/1
975 TABLET ORAL ONCE
Status: COMPLETED | OUTPATIENT
Start: 2023-04-25 | End: 2023-04-25

## 2023-04-25 RX ORDER — PROPARACAINE HYDROCHLORIDE 5 MG/ML
1 SOLUTION/ DROPS OPHTHALMIC ONCE
Status: COMPLETED | OUTPATIENT
Start: 2023-04-25 | End: 2023-04-25

## 2023-04-25 RX ORDER — PREDNISOLONE ACETATE 10 MG/ML
1 SUSPENSION/ DROPS OPHTHALMIC 4 TIMES DAILY
Qty: 5 ML | Refills: 0 | Status: SHIPPED | OUTPATIENT
Start: 2023-04-25 | End: 2023-09-07

## 2023-04-25 RX ORDER — CYCLOPENTOLAT/TROPIC/PHENYLEPH 1%-1%-2.5%
1 DROPS (EA) OPHTHALMIC (EYE)
Status: COMPLETED | OUTPATIENT
Start: 2023-04-25 | End: 2023-04-25

## 2023-04-25 RX ORDER — PROPOFOL 10 MG/ML
INJECTION, EMULSION INTRAVENOUS PRN
Status: DISCONTINUED | OUTPATIENT
Start: 2023-04-25 | End: 2023-04-25

## 2023-04-25 RX ORDER — DEXAMETHASONE SODIUM PHOSPHATE 4 MG/ML
INJECTION, SOLUTION INTRA-ARTICULAR; INTRALESIONAL; INTRAMUSCULAR; INTRAVENOUS; SOFT TISSUE PRN
Status: DISCONTINUED | OUTPATIENT
Start: 2023-04-25 | End: 2023-04-25 | Stop reason: HOSPADM

## 2023-04-25 RX ORDER — BALANCED SALT SOLUTION 6.4; .75; .48; .3; 3.9; 1.7 MG/ML; MG/ML; MG/ML; MG/ML; MG/ML; MG/ML
SOLUTION OPHTHALMIC PRN
Status: DISCONTINUED | OUTPATIENT
Start: 2023-04-25 | End: 2023-04-25 | Stop reason: HOSPADM

## 2023-04-25 RX ORDER — FENTANYL CITRATE 50 UG/ML
INJECTION, SOLUTION INTRAMUSCULAR; INTRAVENOUS PRN
Status: DISCONTINUED | OUTPATIENT
Start: 2023-04-25 | End: 2023-04-25

## 2023-04-25 RX ORDER — SODIUM CHLORIDE, SODIUM LACTATE, POTASSIUM CHLORIDE, CALCIUM CHLORIDE 600; 310; 30; 20 MG/100ML; MG/100ML; MG/100ML; MG/100ML
INJECTION, SOLUTION INTRAVENOUS CONTINUOUS
Status: DISCONTINUED | OUTPATIENT
Start: 2023-04-25 | End: 2023-04-26 | Stop reason: HOSPADM

## 2023-04-25 RX ADMIN — ACETAMINOPHEN 975 MG: 325 TABLET ORAL at 06:50

## 2023-04-25 RX ADMIN — SODIUM CHLORIDE, SODIUM LACTATE, POTASSIUM CHLORIDE, CALCIUM CHLORIDE: 600; 310; 30; 20 INJECTION, SOLUTION INTRAVENOUS at 06:56

## 2023-04-25 RX ADMIN — Medication 1 DROP: at 06:58

## 2023-04-25 RX ADMIN — PROPARACAINE HYDROCHLORIDE 1 DROP: 5 SOLUTION/ DROPS OPHTHALMIC at 06:53

## 2023-04-25 RX ADMIN — Medication 1 DROP: at 07:03

## 2023-04-25 RX ADMIN — PROPOFOL 35 MG: 10 INJECTION, EMULSION INTRAVENOUS at 08:04

## 2023-04-25 RX ADMIN — Medication 1 DROP: at 06:53

## 2023-04-25 RX ADMIN — FENTANYL CITRATE 50 MCG: 50 INJECTION, SOLUTION INTRAMUSCULAR; INTRAVENOUS at 08:02

## 2023-04-25 NOTE — OP NOTE
SURGEON:  EVANGELINA KYLE MD  Assistant surgeon: Karthik Del Angel MD  PREOPERATIVE DIAGNOSIS:  visually significant nucleosclerotic cataract, left eye  POSTOPERATIVE DIAGNOSIS: same  NAME OF THE PROCEDURE: Phacoemulsification with intraocular lens implantation, left eye  ANESTHESIA: Monitored anesthesia care and retrobulbar block   COMPLICATIONS: none  INDICATIONS: Dottie Wilhelm is a 73 year old F with diagnosis of visually significant cataract, here for cataract surgery    DESCRIPTION OF THE PROCEDURE:  The patient was taken to the operative room where intravenous sedation was administered and a retrobulbar block consisting of a 1:1 mixture of 2%lidocaine and 0.75% marcaine with epinephrine and wydase, was administered to the operative eye with adequate anesthesia and akinesia.    The operative eye was prepped and draped in the usual sterile surgical fashion for ophthalmic surgery, including the installation of one drop of 5% Povidone Iodine.  A sterile drape was placed over the face and body and a lid speculum was inserted.      With the use of a Supersharp blade and 0.12 forceps, a paracentesis was created at the 3 o'clock position, and viscoelastic was injected into the anterior chamber using a canula.  A 2.5 mm keratome was then used to construct a clear corneal incision at the 10 o'clock position.  Using Utrata forceps and cystotome needle, a continuous curvilinear capsulorrhexis was created and hydrodissection was undertaken with the use of BSS.  The nucleus was found to be freely mobile and then removed by phacoemulsification using a divide and conquer technique.  The remaining elements of cortex were then removed with irrigation/aspiration.  An IOL,was injected into the capsular bag and was rotated into a good position with a Sinskey hook. The remaining elements of viscoelastic were then removed with irrigation/aspiration. The wounds were hydrodissect and were watertight.   The lid speculum was removed.   Subconjunctival injection of Dexamethasone and Vancomycin were administered. The eye was cleaned with wet and dry gauze. Maxitrol ointment was placed on the eye.  A patch and Diez shield were placed over the eye.  The patient was discharge in stable condition having tolerated the procedure well      Implant Name Type Inv. Item Serial No.  Lot No. LRB No. Used Action   LENS CC60WF.210 CLARE[employer] UV ASPHERIC BICONVEX IOL - [id number] Lens/Eye Implant LENS CC60WF.210 CLAREON UV ASPHERIC BICONVEX IOL [id number] [employer]  Left 1 Implanted       The surgery was assisted by Dr. Karthik Del Angel. Due to the delicate and complex nature of this surgery, an assistant was required. He assisted with Cataract extraction. I was present for the entire surgery.

## 2023-04-25 NOTE — ANESTHESIA POSTPROCEDURE EVALUATION
Patient: Dottie Wilhelm    Procedure: Procedure(s):  LEFT EYE PHACOEMULSIFICATION, CATARACT, WITH INTRAOCULAR LENS IMPLANT       Anesthesia Type:  MAC    Note:  Disposition: Outpatient   Postop Pain Control: Uneventful            Sign Out: Well controlled pain   PONV: No   Neuro/Psych: Uneventful            Sign Out: Acceptable/Baseline neuro status   Airway/Respiratory: Uneventful            Sign Out: Acceptable/Baseline resp. status   CV/Hemodynamics: Uneventful            Sign Out: Acceptable CV status; No obvious hypovolemia; No obvious fluid overload   Other NRE:    DID A NON-ROUTINE EVENT OCCUR?            Last vitals:  Vitals Value Taken Time   /62 04/25/23 0912   Temp 36  C (96.8  F) 04/25/23 0848   Pulse 70 04/25/23 0912   Resp 16 04/25/23 0912   SpO2 97 % 04/25/23 0848       Electronically Signed By: Chidi Real MD  April 25, 2023  10:29 AM

## 2023-04-25 NOTE — BRIEF OP NOTE
[facility]    Brief Operative Note    Pre-operative diagnosis: Cortical age-related cataract of both eyes [H25.013]  Post-operative diagnosis Same as pre-operative diagnosis    Procedure: Procedure(s):  LEFT EYE PHACOEMULSIFICATION, CATARACT, WITH INTRAOCULAR LENS IMPLANT  Surgeon: Surgeon(s) and Role:     * Sheyla Dyer MD - Primary     * Karthik Del Angel MD - Resident - Assisting     * Librado Leonard MD - Fellow - Assisting  Anesthesia: MAC with Block   Estimated Blood Loss: Minimal    Drains: None  Specimens: * No specimens in log *  Findings:   as expected.  Complications: None.  Implants:   Implant Name Type Inv. Item Serial No.  Lot No. LRB No. Used Action   LENS CC60WF.210 CLAREON UV ASPHERIC BICONVEX IOL - [id number] Lens/Eye Implant LENS CC60WF.210 CL[employer] UV ASPHERIC BICONVEX IOL [id number] [employer]  Left 1 Implanted

## 2023-04-25 NOTE — DISCHARGE INSTRUCTIONS
Dr. Sheyla Dyer  [facility]  [phone]  Post Operative Cataract Instructions    If you have a gauze eye patch on, please do not remove it until it is removed by your physician at your first post-operative visit.  You will start your eye drops the next day.    Wear the clear eye shield when sleeping for protection for 5 days.    Do not rub the operated eye.    Light sensitivity may be noticed. Sunglasses may be worn for comfort.    Some discomfort and irritation may be noticed. Acetaminophen (Tylenol) or Ibuprofen (Advil) may be taken for discomfort. If pain persists please call Dr. Dyer's office.    Keep the operated eye dry. You may wash your hair, bathe or shower, but keep the operated eye closed while doing so.     If you take glaucoma medications, bring them with you to the clinic on your first post operative visit.    Bring your prescribed eye drops with you to your scheduled post-operative appointment.    Use medication exactly as prescribed by your doctor. You may restart your regular home medications.     Call Dr. Dyer's office at [phone] if any of the following should occur:    Any sudden vision changes, including decreased vision  Nausea or severe headache  Increase in pain not controlled  Signs of infection (pus, increasing redness or tenderness)  Severe sensitivity to light            [facility]  Home Care Following Anesthesia  For 24 hours after surgery:  Get plenty of rest.  A responsible adult must stay with you for at least 24 hours after you leave the surgery center.  Do not drive or use heavy equipment.  If you have weakness or tingling, don't drive or use heavy equipment until this feeling goes away.   Do not drink alcohol.   Avoid strenuous or risky activities.  Ask for help when climbing stairs.  You may feel lightheaded.  IF so, sit for a few minutes before standing.  Have someone help you get up.   If you have nausea  (feel sick to your stomach): Drink only clear liquids such as apple juice, ginger ale, broth or 7-Up.  Rest may also help.  Be sure to drink enough fluids.  Move to a regular diet as you feel able.   You may have a slight fever.  Call the doctor if your fever is over 100 F (37.7 C) (taken under the tongue) or lasts longer than 24 hours.  You may have a dry mouth, a sore throat, muscle aches or trouble sleeping. These should go away after 24 hours.  Do not make important or legal decisions.   It is recommended to avoid smoking.               Tips for taking pain medications  To get the best pain relief possible, remember these points:  Take pain medications as directed, before pain becomes severe.  Pain medication can upset your stomach: taking it with food may help.  Constipation is a common side effect of pain medication. Drink plenty of  fluids.  Eat foods high in fiber. Take a stool softener if recommended by your doctor or pharmacist.  Do not drink alcohol, drive or operate machinery while taking pain medications.  Ask about other ways to control pain, such as with heat, ice or relaxation.    Tylenol/Acetaminophen Consumption  To help encourage the safe use of acetaminophen, the makers of TYLENOL  have lowered the maximum daily dose for single-ingredient Extra Strength TYLENOL  (acetaminophen) products sold in the [place] from 8 pills per day (4,000 mg) to 6 pills per day (3,000 mg). The dosing interval has also changed from 2 pills every 4-6 hours to 2 pills every 6 hours.  If you feel your pain relief is insufficient, you may take Tylenol/Acetaminophen in addition to your narcotic pain medication.   Be careful not to exceed 3,000 mg of Tylenol/Acetaminophen in a 24 hour period from all sources.  If you are taking extra strength Tylenol/acetaminophen (500 mg), the maximum dose is 6 tablets in 24 hours.  If you are taking regular strength acetaminophen (325 mg), the maximum dose is 9 tablets in 24 hours.  Tylenol  975mg given at 6:50AM.  Next dose available at 12:50PM, then follow package directions.    Call a doctor for any of the following:  Signs of infection (fever, growing tenderness at the surgery site, a large amount of drainage or bleeding, severe pain, foul-smelling drainage, redness, swelling).  It has been over 8 to 10 hours since surgery and you are still not able to urinate (pass water).  Headache for over 24 hours.  Signs of Covid-19 infection (temperature over 100 degrees, shortness of breath, cough, loss of taste/smell, generalized body aches, persistent headache, chills, sore throat, nausea/vomiting/diarrhea)  Your doctor is:       Dr. Sheyla Dyer, Ophthalmology: [phone]               Or dial [phone] and ask for the resident on call for:  Ophthalmology  For emergency care, call the:  [facility]:  [phone] (TTY for hearing impaired: [phone])

## 2023-04-25 NOTE — ANESTHESIA CARE TRANSFER NOTE
Patient: Dottie Wilhelm    Procedure: Procedure(s):  LEFT EYE PHACOEMULSIFICATION, CATARACT, WITH INTRAOCULAR LENS IMPLANT WITH TRYPAN BLUE       Diagnosis: Cortical age-related cataract of both eyes [H25.013]  Diagnosis Additional Information: No value filed.    Anesthesia Type:   No value filed.     Note:    Oropharynx: spontaneously breathing  Level of Consciousness: awake  Oxygen Supplementation: room air    Independent Airway: airway patency satisfactory and stable  Dentition: dentition unchanged  Vital Signs Stable: post-procedure vital signs reviewed and stable  Report to RN Given: handoff report given  Patient transferred to: Phase II    Handoff Report: Identifed the Patient, Identified the Reponsible Provider, Reviewed the pertinent medical history, Discussed the surgical course, Reviewed Intra-OP anesthesia mangement and issues during anesthesia, Set expectations for post-procedure period and Allowed opportunity for questions and acknowledgement of understanding      Vitals:  Vitals Value Taken Time   BP     Temp     Pulse     Resp     SpO2         Electronically Signed By: NI Tony CRNA  April 25, 2023  8:45 AM

## 2023-04-25 NOTE — ANESTHESIA PREPROCEDURE EVALUATION
Anesthesia Pre-Procedure Evaluation    Patient: Dottie Wilhelm   MRN: [id number] : 1950        Procedure : Procedure(s):  LEFT EYE PHACOEMULSIFICATION, CATARACT, WITH INTRAOCULAR LENS IMPLANT          Past Medical History:   Diagnosis Date     Arthritis      Breast cancer (H) 2011     Diabetes mellitus (H) 2019     Phlebitis 1998     Pulmonary embolism (H)       History reviewed. No pertinent surgical history.   Allergies   Allergen Reactions     Oxycodone Rash     Penicillins Rash      Social History     Tobacco Use     Smoking status: Former     Types: Cigarettes     Quit date:      Years since quittin.3     Smokeless tobacco: Never   Vaping Use     Vaping status: Not on file   Substance Use Topics     Alcohol use: Not on file      Wt Readings from Last 1 Encounters:   23 80.7 kg (178 lb)           Physical Exam    Airway        Mallampati: III   TM distance: > 3 FB   Neck ROM: full   Mouth opening: > 3 cm    Respiratory Devices and Support         Dental       (+) Multiple crowns, permanant bridges      Cardiovascular   cardiovascular exam normal          Pulmonary   pulmonary exam normal                OUTSIDE LABS:  CBC: No results found for: WBC, HGB, HCT, PLT  BMP:   Lab Results   Component Value Date     (H) 2023     (H) 2023     COAGS: No results found for: PTT, INR, FIBR  POC: No results found for: BGM, HCG, HCGS  HEPATIC: No results found for: ALBUMIN, PROTTOTAL, ALT, AST, GGT, ALKPHOS, BILITOTAL, BILIDIRECT, JOHNATHON  OTHER: No results found for: PH, LACT, A1C, SCOOTER, PHOS, MAG, LIPASE, AMYLASE, TSH, T4, T3, CRP, SED    Anesthesia Plan    ASA Status:  2   NPO Status:  NPO Appropriate    Anesthesia Type: MAC.     - Reason for MAC: straight local not clinically adequate   Induction: Intravenous, Propofol.           Consents    Anesthesia Plan(s) and associated risks, benefits, and realistic alternatives discussed. Questions answered and  patient/representative(s) expressed understanding.    - Discussed:     - Discussed with:  Patient      - Extended Intubation/Ventilatory Support Discussed: No.      - Patient is DNR/DNI Status: No    Use of blood products discussed: No .     Postoperative Care    Pain management: Multi-modal analgesia.   PONV prophylaxis: Ondansetron (or other 5HT-3)     Comments:           H&P reviewed: Unable to attach H&P to encounter due to EHR limitations. H&P Update: appropriate H&P reviewed, patient examined. No interval changes since H&P (within 30 days).         Chidi Real MD

## 2023-04-26 ENCOUNTER — OFFICE VISIT (OUTPATIENT)
Dept: OPHTHALMOLOGY | Facility: CLINIC | Age: 73
End: 2023-04-26
Attending: OPHTHALMOLOGY
Payer: COMMERCIAL

## 2023-04-26 DIAGNOSIS — Z48.810 AFTERCARE FOLLOWING SURGERY OF A SENSORY ORGAN: Primary | ICD-10-CM

## 2023-04-26 PROCEDURE — 99024 POSTOP FOLLOW-UP VISIT: CPT | Performed by: OPHTHALMOLOGY

## 2023-04-26 PROCEDURE — G0463 HOSPITAL OUTPT CLINIC VISIT: HCPCS | Performed by: OPHTHALMOLOGY

## 2023-04-26 ASSESSMENT — VISUAL ACUITY
OD_CC+: -2
METHOD: SNELLEN - LINEAR
OS_SC+: +1
OS_SC: 20/30
OD_CC: 20/30

## 2023-04-26 ASSESSMENT — TONOMETRY
OS_IOP_MMHG: 13
IOP_METHOD: TONOPEN
OD_IOP_MMHG: 14

## 2023-04-26 ASSESSMENT — EXTERNAL EXAM - LEFT EYE: OS_EXAM: NORMAL

## 2023-04-26 ASSESSMENT — EXTERNAL EXAM - RIGHT EYE: OD_EXAM: NORMAL

## 2023-04-26 ASSESSMENT — CUP TO DISC RATIO: OD_RATIO: 0.4

## 2023-04-26 ASSESSMENT — SLIT LAMP EXAM - LIDS
COMMENTS: NORMAL
COMMENTS: NORMAL

## 2023-04-26 NOTE — PROGRESS NOTES
CC -  status post 25 g Pars plana vitrectomy (PPV)/ endolaser/ air fluid exchange left eye 01/17/23   Because of  Vitreous hemorrhage and rvo   Interval: Improving vision; No eye pain  [facility] - Dottie Wihlelm is a 73 year old female here for evaluation of left eye vision loss. She was in the ED last night and was referred to us.     PAST MEDICAL HISTORY: DM2 - controlled w/ diet/excercise; PE (1975); DVT (2016) - on Xarelto   H/o osteomyelitis, left leg (1977); Breast cancer s/p radiation/chemo 2011  PAST OCULAR SURGERY: None, no laser in situ keratomileusis     RETINAL IMAGING:   Optical Coherence Tomography 02/15/23   Right eye: normal foveal contour  Left eye: good foveal contour; sup and temporal retina thinning    ASSESSMENT & PLAN  #POD1 Cataract extraction intraocular lens left eye 04/25/23   Intraocular lens in good position  slept well   Doing well    #status post 25 g Pars plana vitrectomy (PPV)/ endolaser/ air fluid exchange left eye 01/17/23   Secondary to  To vitreous hemorrhage - RVO   Slept well  Retina attached  Doing well    # H/o RVO left   - diagnosed 5/2017   - received one intravitreal injection    # DM2   - diagnosed 2018   - no BDR of the right eye   - A1c controlled (<7)    # Cataract right eye   Glare Testing (BAT)     Off Low Medium High   Right 20/25-3 20/40- 20/70- 20/50   Left 20/30 20/50-2 20/60 20/80+1        Plan for Cataract extraction intraocular lens right eye   Surgeon procedure time:  45 min  Urgency of Surgery: Routine  Post-op apps needed: 1day; 1 week; 3 weeks  Multi surgeon case: No   H&P completed by primary care physician/PAC   needed: no   Anesthesia: Peribulbar block    The cataract is visually significant, Reports impacts ADL and has glare   Dilation:Good  Iris expansion: Not needed  Epinephrine: No  Pseudoexfoliation: NO  Trypan Blue: YES   Phacodonesis: No  Cornea guttae: rare  Aim for: plano  Anesthesia: peribulbar block  Anticoagulants: NO  Candidate for  "multifocal or toric IOL: NO  Visually significant astigmatism: NO    I reviewed the indications, risks, benefits, and alternatives of the proposed surgical procedure. We discussed at length cataracts and the effect of the cataracts on vision.   We discussed the fact that modern cataract surgery is usually successful at alleviating symptoms of glare when the cataract is the causative factor. Other risks were discussed with patient including, but not limited to, failure to improve vision or further loss of vision, bleeding, infection, loss of vision and the remote possibility of complications of anesthesia or need for additional surgery. 1:1000 risk of infection/bleed/loss of eye; 1:100 risk of RD and need for further surgery.  Patient agreed to proceed with surgery.  I provided multiple opportunities for the questions, answered all questions to the best of my ability, and confirmed that my answers and my discussion were understood.     PLAN:   Ketorolac (gray top) four times a day    Predforte  (pink top) four times a day  (shake the bottle before)  Ofloxacin (tan top) four times a day    Put the eyedrops 5 minutes a part  Eye shield or glasses at all times x 3 weeks  Sleep with the shield  No heavy lifting   Follow-up in one week- no dilation  Retina detachment and endophthalmitis precautions were discussed with the patient (increased blurry vision, drainage, new flashes, floaters or a curtain in the visual field) and was asked to return if any of the those occur    What to watch out for:  If you experience any of the following \"RSVP Symptoms\", you should call immediately:    Worsening Redness    Worsening Sensitivity to light    Worsening Vision, including new flashing lights or floaters    Worsening Pain, including nausea/vomiting      ~~~~~~~~~~~~~~~~~~~~~~~~~~~~~~~~~~   Complete documentation of historical and exam elements from today's encounter can be found in the full encounter summary report (not " reduplicated in this progress note).  I personally obtained the chief complaint(s) and history of present illness.  I confirmed and edited as necessary the review of systems, past medical/surgical history, family history, social history, and examination findings as documented by others; and I examined the patient myself.  I personally reviewed the relevant tests, images, and reports as documented above.  I formulated and edited as necessary the assessment and plan and discussed the findings and management plan with the patient and family    Sheyla Dyer MD  Professor of Ophthalmology  Vitreo-Retinal surgeon   Department of Ophthalmology and Visual Neurosciences   [facility]  Phone: [phone]   Fax: [phone]

## 2023-04-26 NOTE — NURSING NOTE
Chief Complaints and History of Present Illnesses   Patient presents with     Post Op (Ophthalmology) Left Eye     LEFT EYE PHACOEMULSIFICATION, CATARACT, WITH INTRAOCULAR LENS IMPLANT     Chief Complaint(s) and History of Present Illness(es)     Post Op (Ophthalmology) Left Eye            Comments: LEFT EYE PHACOEMULSIFICATION, CATARACT, WITH INTRAOCULAR LENS IMPLANT          Comments    Day 1 PO, doing well   No concerns or eye pain    Meena Cruz COT 10:02 AM April 26, 2023

## 2023-05-03 ENCOUNTER — OFFICE VISIT (OUTPATIENT)
Dept: OPHTHALMOLOGY | Facility: CLINIC | Age: 73
End: 2023-05-03
Attending: OPHTHALMOLOGY
Payer: COMMERCIAL

## 2023-05-03 DIAGNOSIS — Z48.810 AFTERCARE FOLLOWING SURGERY OF A SENSORY ORGAN: Primary | ICD-10-CM

## 2023-05-03 DIAGNOSIS — H25.811 MIXED TYPE AGE-RELATED CATARACT, RIGHT EYE: ICD-10-CM

## 2023-05-03 PROCEDURE — G0463 HOSPITAL OUTPT CLINIC VISIT: HCPCS | Performed by: OPHTHALMOLOGY

## 2023-05-03 PROCEDURE — 99024 POSTOP FOLLOW-UP VISIT: CPT | Mod: GC | Performed by: OPHTHALMOLOGY

## 2023-05-03 ASSESSMENT — REFRACTION_WEARINGRX
OD_SPHERE: +1.50
OD_AXIS: 009
OS_CYLINDER: +0.75
OD_ADD: +2.75
OS_SPHERE: +1.25
SPECS_TYPE: PAL
OD_CYLINDER: +0.25
OS_ADD: +2.75
OS_AXIS: 062

## 2023-05-03 ASSESSMENT — VISUAL ACUITY
OS_CC: 20/20
CORRECTION_TYPE: GLASSES
METHOD: SNELLEN - LINEAR
OS_CC+: -1
OD_CC: 20/25
OD_CC+: -1

## 2023-05-03 ASSESSMENT — TONOMETRY
IOP_METHOD: TONOPEN
OD_IOP_MMHG: 9
OS_IOP_MMHG: 10

## 2023-05-03 ASSESSMENT — SLIT LAMP EXAM - LIDS
COMMENTS: NORMAL
COMMENTS: NORMAL

## 2023-05-03 ASSESSMENT — EXTERNAL EXAM - LEFT EYE: OS_EXAM: NORMAL

## 2023-05-03 ASSESSMENT — EXTERNAL EXAM - RIGHT EYE: OD_EXAM: NORMAL

## 2023-05-03 NOTE — NURSING NOTE
Chief Complaints and History of Present Illnesses   Patient presents with     Post Op (Ophthalmology) Left Eye     Chief Complaint(s) and History of Present Illness(es)     Post Op (Ophthalmology) Left Eye            Laterality: left eye    Course: stable    Associated symptoms: eye pain (mild).  Negative for flashes and floaters    Treatments tried: eye drops          Comments    Here for CE/IOL post op left eye. Vision is doing well - very happy with results. Compliant with drops. Mild pain. Patient inquires of how long to take sx drops.    Roni Matos COT 1:49 PM May 3, 2023

## 2023-05-03 NOTE — PROGRESS NOTES
"CC -  status post Cataract extraction intraocular lens left eye 4.25.2023  Status post 25 g Pars plana vitrectomy (PPV)/ endolaser/ air fluid exchange left eye 01/17/23   Because of  Vitreous hemorrhage and rvo     Interval: POW1 s/p left cataract surgery. Very happy with left eye vision; No eye pain. She sees one small floater from time to time, no flashes. Noticing a small \"light reflection\" in the left peripheral vision of the left eye only intermittently when there is a light source in an otherwise dark area, not bothersome at all per patient.   Currently taking prednisolone, oflox, and ketorolac all QID left eye.       [facility] - Dottie Wilhelm is a 73 year old female here for evaluation of left eye vision loss. She was in the ED last night and was referred to us.     PAST MEDICAL HISTORY: DM2 - controlled w/ diet/excercise; PE (1975); DVT (2016) - on Xarelto   H/o osteomyelitis, left leg (1977); Breast cancer s/p radiation/chemo 2011  PAST OCULAR SURGERY: None, no laser in situ keratomileusis     RETINAL IMAGING:   Optical Coherence Tomography 02/15/23   Right eye: normal foveal contour  Left eye: good foveal contour; sup and temporal retina thinning      ASSESSMENT & PLAN  # POW1 Cataract extraction intraocular lens left eye 04/25/23  Intraocular lens in good position  Doing well, [facility] 20/20    #status post 25 g Pars plana vitrectomy (PPV)/ endolaser/ air fluid exchange left eye 01/17/23   Secondary to  To vitreous hemorrhage - RVO   Retina attached  Doing well    # H/o RVO left   - diagnosed 5/2017   - received one intravitreal injection    # DM2   - diagnosed 2018   - no BDR of the right eye   - A1c controlled (<7)    # Cataract right eye   Glare Testing (BAT)     Off Low Medium High   Right 20/25-3 20/40- 20/70- 20/50   Left 20/30 20/50-2 20/60 20/80+1        Plan for Cataract extraction intraocular lens right eye   Surgeon procedure time:  45 min  Urgency of Surgery: Routine  Post-op apps needed: 1day; 1 week; 3 " weeks  Multi surgeon case: No   H&P completed by primary care physician/PAC   needed: no   Anesthesia: Peribulbar block    The cataract is visually significant, Reports impacts ADL and has glare   Dilation:Good  Iris expansion: Not needed  Epinephrine: No  Pseudoexfoliation: NO  Trypan Blue: YES   Phacodonesis: No  Cornea guttae: rare  Aim for: plano  Anesthesia: peribulbar block  Anticoagulants: NO  Candidate for multifocal or toric IOL: NO  Visually significant astigmatism: NO    I reviewed the indications, risks, benefits, and alternatives of the proposed surgical procedure. We discussed at length cataracts and the effect of the cataracts on vision.   We discussed the fact that modern cataract surgery is usually successful at alleviating symptoms of glare when the cataract is the causative factor. Other risks were discussed with patient including, but not limited to, failure to improve vision or further loss of vision, bleeding, infection, loss of vision and the remote possibility of complications of anesthesia or need for additional surgery. 1:1000 risk of infection/bleed/loss of eye; 1:100 risk of RD and need for further surgery.  Patient agreed to proceed with surgery.  I provided multiple opportunities for the questions, answered all questions to the best of my ability, and confirmed that my answers and my discussion were understood.     PLAN:   Start to taper left eye Ketorolac (gray top) Three times a day x 1 week, then twice a day x 1 week and then once a day till finish  Start to taper left eye Predforte  (pink top)Three times a day x 1 week, then twice a day x 1 week and then once a day till finish  (shake the bottle before)  Stop the left eye Ofloxacin (tan top).   Put the eyedrops 5 minutes a part  Eye shield or glasses at all times x 3 weeks  Sleep with the shield  No heavy lifting   Follow-up for POM1 visit as scheduled.   Retina detachment and endophthalmitis precautions were discussed  "with the patient (increased blurry vision, drainage, new flashes, floaters or a curtain in the visual field) and was asked to return if any of the those occur    What to watch out for:  If you experience any of the following \"RSVP Symptoms\", you should call immediately:    Worsening Redness    Worsening Sensitivity to light    Worsening Vision, including new flashing lights or floaters    Worsening Pain, including nausea/vomiting    Ok to schedule Cataract extraction intraocular lens right eye   Follow up 3 weeks or POD1    Karthik Del Angel MD  Ophthalmology, PGY-3    ~~~~~~~~~~~~~~~~~~~~~~~~~~~~~~~~~~   Complete documentation of historical and exam elements from today's encounter can be found in the full encounter summary report (not reduplicated in this progress note).  I personally obtained the chief complaint(s) and history of present illness.  I confirmed and edited as necessary the review of systems, past medical/surgical history, family history, social history, and examination findings as documented by others; and I examined the patient myself.  I personally reviewed the relevant tests, images, and reports as documented above.  I formulated and edited as necessary the assessment and plan and discussed the findings and management plan with the patient and family    Sheyla Dyer MD  Professor of Ophthalmology  Vitreo-Retinal surgeon   Department of Ophthalmology and Visual Neurosciences   [facility]  Phone: [phone]   Fax: [phone]   "

## 2023-05-04 ENCOUNTER — TELEPHONE (OUTPATIENT)
Dept: OPHTHALMOLOGY | Facility: CLINIC | Age: 73
End: 2023-05-04
Payer: COMMERCIAL

## 2023-05-04 PROBLEM — H25.811 MIXED TYPE AGE-RELATED CATARACT, RIGHT EYE: Status: ACTIVE | Noted: 2023-05-04

## 2023-05-04 NOTE — TELEPHONE ENCOUNTER
Spoke with Dottie, she is just arriving home and needs to bring her groceries up to the apartment and asked that I call back in 10-15 minutes. I let Dottie know I would call back around 10:30 AM.

## 2023-05-04 NOTE — TELEPHONE ENCOUNTER
Patient is scheduled for surgery with Dr. Sheyla Dyer     Spoke with: Dottie     Date of Surgery: 05/09/23     Location: [facility]:  [address] [place] [id number]     H&P was completed at: [facility] 04/20 in chart    Post Op scheduled on 05/10, 05/17, and 06/07     Surgery packet was mailed 05/04     Additional comments: Advised RN will call 1 - 3 business days prior with arrival time and instructions. Informed patient they will need an adult  and responsible adult to stay with for 24 hours following surgery

## 2023-05-08 ENCOUNTER — ANESTHESIA EVENT (OUTPATIENT)
Dept: SURGERY | Facility: AMBULATORY SURGERY CENTER | Age: 73
End: 2023-05-08
Payer: COMMERCIAL

## 2023-05-09 ENCOUNTER — HOSPITAL ENCOUNTER (OUTPATIENT)
Facility: AMBULATORY SURGERY CENTER | Age: 73
Discharge: HOME | End: 2023-05-09
Attending: OPHTHALMOLOGY
Payer: COMMERCIAL

## 2023-05-09 ENCOUNTER — ANESTHESIA (OUTPATIENT)
Dept: SURGERY | Facility: AMBULATORY SURGERY CENTER | Age: 73
End: 2023-05-09
Payer: COMMERCIAL

## 2023-05-09 VITALS
HEIGHT: 61 IN | RESPIRATION RATE: 14 BRPM | WEIGHT: 175 LBS | DIASTOLIC BLOOD PRESSURE: 39 MMHG | SYSTOLIC BLOOD PRESSURE: 124 MMHG | OXYGEN SATURATION: 96 % | BODY MASS INDEX: 33.04 KG/M2 | TEMPERATURE: 98.2 F | HEART RATE: 64 BPM

## 2023-05-09 DIAGNOSIS — Z48.810 AFTERCARE FOLLOWING SURGERY OF A SENSE ORGAN: Primary | ICD-10-CM

## 2023-05-09 DIAGNOSIS — H25.811 MIXED TYPE AGE-RELATED CATARACT, RIGHT EYE: ICD-10-CM

## 2023-05-09 PROCEDURE — 66984 XCAPSL CTRC RMVL W/O ECP: CPT | Mod: RT

## 2023-05-09 PROCEDURE — 66984 XCAPSL CTRC RMVL W/O ECP: CPT | Mod: 79 | Performed by: OPHTHALMOLOGY

## 2023-05-09 DEVICE — LENS CC60WF 21.0 CLAREON UV ASPHERIC BICONVEX IOL: Type: IMPLANTABLE DEVICE | Site: EYE | Status: FUNCTIONAL

## 2023-05-09 RX ORDER — ONDANSETRON 2 MG/ML
INJECTION INTRAMUSCULAR; INTRAVENOUS PRN
Status: DISCONTINUED | OUTPATIENT
Start: 2023-05-09 | End: 2023-05-09

## 2023-05-09 RX ORDER — CYCLOPENTOLAT/TROPIC/PHENYLEPH 1%-1%-2.5%
1 DROPS (EA) OPHTHALMIC (EYE)
Status: COMPLETED | OUTPATIENT
Start: 2023-05-09 | End: 2023-05-09

## 2023-05-09 RX ORDER — PREDNISOLONE ACETATE 10 MG/ML
1 SUSPENSION/ DROPS OPHTHALMIC 4 TIMES DAILY
Qty: 5 ML | Refills: 1 | Status: SHIPPED | OUTPATIENT
Start: 2023-05-09 | End: 2023-09-07

## 2023-05-09 RX ORDER — OXYCODONE HYDROCHLORIDE 5 MG/1
5 TABLET ORAL
Status: DISCONTINUED | OUTPATIENT
Start: 2023-05-09 | End: 2023-05-10 | Stop reason: HOSPADM

## 2023-05-09 RX ORDER — SODIUM CHLORIDE, SODIUM LACTATE, POTASSIUM CHLORIDE, CALCIUM CHLORIDE 600; 310; 30; 20 MG/100ML; MG/100ML; MG/100ML; MG/100ML
INJECTION, SOLUTION INTRAVENOUS CONTINUOUS
Status: DISCONTINUED | OUTPATIENT
Start: 2023-05-09 | End: 2023-05-10 | Stop reason: HOSPADM

## 2023-05-09 RX ORDER — FENTANYL CITRATE 50 UG/ML
50 INJECTION, SOLUTION INTRAMUSCULAR; INTRAVENOUS EVERY 5 MIN PRN
Status: DISCONTINUED | OUTPATIENT
Start: 2023-05-09 | End: 2023-05-10 | Stop reason: HOSPADM

## 2023-05-09 RX ORDER — DEXAMETHASONE SODIUM PHOSPHATE 4 MG/ML
INJECTION, SOLUTION INTRA-ARTICULAR; INTRALESIONAL; INTRAMUSCULAR; INTRAVENOUS; SOFT TISSUE PRN
Status: DISCONTINUED | OUTPATIENT
Start: 2023-05-09 | End: 2023-05-09 | Stop reason: HOSPADM

## 2023-05-09 RX ORDER — PROPARACAINE HYDROCHLORIDE 5 MG/ML
1 SOLUTION/ DROPS OPHTHALMIC ONCE
Status: COMPLETED | OUTPATIENT
Start: 2023-05-09 | End: 2023-05-09

## 2023-05-09 RX ORDER — BALANCED SALT SOLUTION 6.4; .75; .48; .3; 3.9; 1.7 MG/ML; MG/ML; MG/ML; MG/ML; MG/ML; MG/ML
SOLUTION OPHTHALMIC PRN
Status: DISCONTINUED | OUTPATIENT
Start: 2023-05-09 | End: 2023-05-09 | Stop reason: HOSPADM

## 2023-05-09 RX ORDER — OFLOXACIN 3 MG/ML
1 SOLUTION/ DROPS OPHTHALMIC 4 TIMES DAILY
Qty: 5 ML | Refills: 0 | Status: SHIPPED | OUTPATIENT
Start: 2023-05-09 | End: 2023-09-07

## 2023-05-09 RX ORDER — ONDANSETRON 2 MG/ML
4 INJECTION INTRAMUSCULAR; INTRAVENOUS EVERY 30 MIN PRN
Status: DISCONTINUED | OUTPATIENT
Start: 2023-05-09 | End: 2023-05-10 | Stop reason: HOSPADM

## 2023-05-09 RX ORDER — LIDOCAINE 40 MG/G
CREAM TOPICAL
Status: DISCONTINUED | OUTPATIENT
Start: 2023-05-09 | End: 2023-05-10 | Stop reason: HOSPADM

## 2023-05-09 RX ORDER — TETRACAINE HYDROCHLORIDE 5 MG/ML
SOLUTION OPHTHALMIC PRN
Status: DISCONTINUED | OUTPATIENT
Start: 2023-05-09 | End: 2023-05-09 | Stop reason: HOSPADM

## 2023-05-09 RX ORDER — FENTANYL CITRATE 50 UG/ML
25 INJECTION, SOLUTION INTRAMUSCULAR; INTRAVENOUS EVERY 5 MIN PRN
Status: DISCONTINUED | OUTPATIENT
Start: 2023-05-09 | End: 2023-05-10 | Stop reason: HOSPADM

## 2023-05-09 RX ORDER — HYDROMORPHONE HYDROCHLORIDE 1 MG/ML
0.2 INJECTION, SOLUTION INTRAMUSCULAR; INTRAVENOUS; SUBCUTANEOUS EVERY 5 MIN PRN
Status: DISCONTINUED | OUTPATIENT
Start: 2023-05-09 | End: 2023-05-10 | Stop reason: HOSPADM

## 2023-05-09 RX ORDER — PROPOFOL 10 MG/ML
INJECTION, EMULSION INTRAVENOUS PRN
Status: DISCONTINUED | OUTPATIENT
Start: 2023-05-09 | End: 2023-05-09

## 2023-05-09 RX ORDER — ACETAMINOPHEN 325 MG/1
975 TABLET ORAL ONCE
Status: COMPLETED | OUTPATIENT
Start: 2023-05-09 | End: 2023-05-09

## 2023-05-09 RX ORDER — LIDOCAINE HYDROCHLORIDE 20 MG/ML
INJECTION, SOLUTION INFILTRATION; PERINEURAL PRN
Status: DISCONTINUED | OUTPATIENT
Start: 2023-05-09 | End: 2023-05-09

## 2023-05-09 RX ORDER — KETOROLAC TROMETHAMINE 5 MG/ML
1 SOLUTION OPHTHALMIC 4 TIMES DAILY
Qty: 5 ML | Refills: 0 | Status: SHIPPED | OUTPATIENT
Start: 2023-05-09 | End: 2023-09-07

## 2023-05-09 RX ORDER — OXYCODONE HYDROCHLORIDE 5 MG/1
10 TABLET ORAL
Status: DISCONTINUED | OUTPATIENT
Start: 2023-05-09 | End: 2023-05-10 | Stop reason: HOSPADM

## 2023-05-09 RX ORDER — LABETALOL HYDROCHLORIDE 5 MG/ML
10 INJECTION, SOLUTION INTRAVENOUS
Status: DISCONTINUED | OUTPATIENT
Start: 2023-05-09 | End: 2023-05-10 | Stop reason: HOSPADM

## 2023-05-09 RX ORDER — HYDROMORPHONE HYDROCHLORIDE 1 MG/ML
0.4 INJECTION, SOLUTION INTRAMUSCULAR; INTRAVENOUS; SUBCUTANEOUS EVERY 5 MIN PRN
Status: DISCONTINUED | OUTPATIENT
Start: 2023-05-09 | End: 2023-05-10 | Stop reason: HOSPADM

## 2023-05-09 RX ORDER — ONDANSETRON 4 MG/1
4 TABLET, ORALLY DISINTEGRATING ORAL EVERY 30 MIN PRN
Status: DISCONTINUED | OUTPATIENT
Start: 2023-05-09 | End: 2023-05-10 | Stop reason: HOSPADM

## 2023-05-09 RX ADMIN — PROPOFOL 30 MG: 10 INJECTION, EMULSION INTRAVENOUS at 08:40

## 2023-05-09 RX ADMIN — ONDANSETRON 4 MG: 2 INJECTION INTRAMUSCULAR; INTRAVENOUS at 08:44

## 2023-05-09 RX ADMIN — ACETAMINOPHEN 975 MG: 325 TABLET ORAL at 07:18

## 2023-05-09 RX ADMIN — Medication 1 DROP: at 07:23

## 2023-05-09 RX ADMIN — LIDOCAINE HYDROCHLORIDE 20 MG: 20 INJECTION, SOLUTION INFILTRATION; PERINEURAL at 08:40

## 2023-05-09 RX ADMIN — Medication 1 DROP: at 07:27

## 2023-05-09 RX ADMIN — PROPARACAINE HYDROCHLORIDE 1 DROP: 5 SOLUTION/ DROPS OPHTHALMIC at 07:19

## 2023-05-09 RX ADMIN — Medication 1 DROP: at 07:19

## 2023-05-09 RX ADMIN — SODIUM CHLORIDE, SODIUM LACTATE, POTASSIUM CHLORIDE, CALCIUM CHLORIDE: 600; 310; 30; 20 INJECTION, SOLUTION INTRAVENOUS at 07:25

## 2023-05-09 NOTE — ANESTHESIA POSTPROCEDURE EVALUATION
Patient: Dottie Wilhelm    Procedure: Procedure(s):  RIGHT EYE PHACOEMULSIFICATION, CATARACT, WITH INTRAOCULAR LENS IMPLANT with trypan blue       Anesthesia Type:  MAC    Note:  Disposition: Outpatient   Postop Pain Control: Uneventful            Sign Out: Well controlled pain   PONV: No   Neuro/Psych: Uneventful            Sign Out: Acceptable/Baseline neuro status   Airway/Respiratory: Uneventful            Sign Out: Acceptable/Baseline resp. status   CV/Hemodynamics: Uneventful            Sign Out: Acceptable CV status; No obvious hypovolemia; No obvious fluid overload   Other NRE: NONE   DID A NON-ROUTINE EVENT OCCUR?            Last vitals:  Vitals Value Taken Time   /39 05/09/23 0945   Temp 36.8  C (98.2  F) 05/09/23 0945   Pulse 64 05/09/23 0945   Resp 14 05/09/23 0945   SpO2 96 % 05/09/23 0945       Electronically Signed By: Luke Cruz MD  May 9, 2023  2:21 PM

## 2023-05-09 NOTE — OP NOTE
SURGEON:   EVANGELINA KYLE   Assistant surgeon: Librado Leonard MD, MPH  PREOPERATIVE DIAGNOSIS:   1. visually significant cataract RIGHT EYE  POSTOPERATIVE DIAGNOSIS: same  NAME OF THE PROCEDURE: Phacoemulsification with intraocular lens implantation, RIGHT EYE  ANESTHESIA: Monitored anesthesia care and peribulbar block   COMPLICATIONS: none  INDICATIONS: Dottie Wilhelm is a 73 year old with diagnosis of visually significant cataract, here for cataract surgery    DESCRIPTION OF THE PROCEDURE:  The patient was taken to the operative room where intravenous sedation was administered and a perbulbar block consisting of a 1:1 mixture of 2%lidocaine and 0.75% marcaine with epinephrine and wydase, was administered to the operative eye with adequate anesthesia and akinesia.    The operative eye was prepped and draped in the usual sterile surgical fashion for ophthalmic surgery, including the installation of one drop of 5% Povidone Iodine.  A sterile drape was placed over the face and body and a lid speculum was inserted.      With the use of a Supersharp blade and 0.12 forceps, a paracentesis was created at the 2 o'clock position, and air was injected into the Anterior chamber. Next, tripan blue was used stain the anterior capsule. The anterior chamber was irrigated. Next, viscoelastic was injected into the anterior chamber using a canula.  A 2.5 mm keratome was then used to construct a clear corneal incision at the 10 o'clock position.  Using Utrata forceps and cystotome needle, a continuous curvilinear capsulorrhexis was created and hydrodissection was undertaken with the use of BSS.  The nucleus was found to be freely mobile and then removed by phacoemulsification using a divide and conquer combined with a stop and chop technique.  The remaining elements of cortex were then removed with irrigation/aspiration.  An IOL,was injected into the capsular bag and was rotated into a good position. The remaining elements of  viscoelastic were then removed with irrigation/aspiration.  The lid speculum was removed.  Subconjunctival injection of Dexamethasone and Ancef as well as intracameral moxifloxacin were administered.  The eye was cleaned with wet and dry gauze. Maxitrol ointment was placed on the eye.  A patch and Diez shield were placed over the eye.     The surgery was assisted by Dr. Librado Leonard, because no qualified resident was available on the day of the surgery. Due to the delicate and complex nature of this surgery, Dr. Leonard was required. Dr. Leonard assisted with the Cataract extraction . I was present for the entire surgery.    Sheyla Dyer MD    Implant Name Type Inv. Item Serial No.  Lot No. LRB No. Used Action   LENS [id number] CLAREON UV ASPHERIC BICONVEX IOL - [id number] Lens/Eye Implant LENS [id number] CLAREON UV ASPHERIC BICONVEX IOL [id number] [employer]  Right 1 Implanted

## 2023-05-09 NOTE — DISCHARGE INSTRUCTIONS
Dr. Sheyla Dyer  [facility]  [phone]  Post Operative Cataract Instructions    If you have a gauze eye patch on, please do not remove it until it is removed by your physician at your first post-operative visit.  You will start your eye drops the next day.    Wear the clear eye shield when sleeping for protection for 5 days.    Do not rub the operated eye.    Light sensitivity may be noticed. Sunglasses may be worn for comfort.    Some discomfort and irritation may be noticed. Acetaminophen (Tylenol) or Ibuprofen (Advil) may be taken for discomfort. If pain persists please call Dr. Dyer's office.    Keep the operated eye dry. You may wash your hair, bathe or shower, but keep the operated eye closed while doing so.     If you take glaucoma medications, bring them with you to the clinic on your first post operative visit.    Bring your prescribed eye drops with you to your scheduled post-operative appointment.    Use medication exactly as prescribed by your doctor. You may restart your regular home medications.     Call Dr. Dyer's office at [phone] if any of the following should occur:    Any sudden vision changes, including decreased vision  Nausea or severe headache  Increase in pain not controlled  Signs of infection (pus, increasing redness or tenderness)  Severe sensitivity to light  [facility]  Home Care Following Anesthesia  For 24 hours after surgery:  Get plenty of rest.  A responsible adult must stay with you for at least 24 hours after you leave the surgery center.  Do not drive or use heavy equipment.  If you have weakness or tingling, don't drive or use heavy equipment until this feeling goes away.   Do not drink alcohol.   Avoid strenuous or risky activities.  Ask for help when climbing stairs.  You may feel lightheaded.  IF so, sit for a few minutes before standing.  Have someone help you get up.   If you have nausea (feel sick to  your stomach): Drink only clear liquids such as apple juice, ginger ale, broth or 7-Up.  Rest may also help.  Be sure to drink enough fluids.  Move to a regular diet as you feel able.   You may have a slight fever.  Call the doctor if your fever is over 100 F (37.7 C) (taken under the tongue) or lasts longer than 24 hours.  You may have a dry mouth, a sore throat, muscle aches or trouble sleeping. These should go away after 24 hours.  Do not make important or legal decisions.   It is recommended to avoid smoking.               Tips for taking pain medications  To get the best pain relief possible, remember these points:  Take pain medications as directed, before pain becomes severe.  Pain medication can upset your stomach: taking it with food may help.  Constipation is a common side effect of pain medication. Drink plenty of  fluids.  Eat foods high in fiber. Take a stool softener if recommended by your doctor or pharmacist.  Do not drink alcohol, drive or operate machinery while taking pain medications.  Ask about other ways to control pain, such as with heat, ice or relaxation.    Tylenol/Acetaminophen Consumption  To help encourage the safe use of acetaminophen, the makers of TYLENOL  have lowered the maximum daily dose for single-ingredient Extra Strength TYLENOL  (acetaminophen) products sold in the [place] from 8 pills per day (4,000 mg) to 6 pills per day (3,000 mg). The dosing interval has also changed from 2 pills every 4-6 hours to 2 pills every 6 hours.  If you feel your pain relief is insufficient, you may take Tylenol/Acetaminophen in addition to your narcotic pain medication.   Be careful not to exceed 3,000 mg of Tylenol/Acetaminophen in a 24 hour period from all sources.  If you are taking extra strength Tylenol/acetaminophen (500 mg), the maximum dose is 6 tablets in 24 hours.  If you are taking regular strength acetaminophen (325 mg), the maximum dose is 9 tablets in 24 hours.    Call a doctor for any  of the following:  Signs of infection (fever, growing tenderness at the surgery site, a large amount of drainage or bleeding, severe pain, foul-smelling drainage, redness, swelling).  It has been over 8 to 10 hours since surgery and you are still not able to urinate (pass water).  Headache for over 24 hours.  Numbness, tingling or weakness the day after surgery (if you had spinal anesthesia).  Signs of Covid-19 infection (temperature over 100 degrees, shortness of breath, cough, loss of taste/smell, generalized body aches, persistent headache, chills, sore throat, nausea/vomiting/diarrhea)  Your doctor is:       Dr. Sheyla Dyer, Ophthalmology: [phone]               Or dial [phone] and ask for the resident on call for:  Ophthalmology  For emergency care, call the:  [facility]:  [phone] (TTY for hearing impaired: [phone])

## 2023-05-09 NOTE — ANESTHESIA PREPROCEDURE EVALUATION
Anesthesia Pre-Procedure Evaluation    Patient: Dottie Wilhelm   MRN: [id number] : 1950        Procedure : Procedure(s):  RIGHT EYE PHACOEMULSIFICATION, CATARACT, WITH INTRAOCULAR LENS IMPLANT with trypan blue          Past Medical History:   Diagnosis Date     Arthritis      Breast cancer (H)      Diabetes mellitus (H) 2019     Phlebitis 1998     Pulmonary embolism (H)       Past Surgical History:   Procedure Laterality Date     PHACOEMULSIFICATION CLEAR CORNEA WITH STANDARD INTRAOCULAR LENS IMPLANT Left 2023    Procedure: LEFT EYE PHACOEMULSIFICATION, CATARACT, WITH INTRAOCULAR LENS IMPLANT;  Surgeon: Sheyla Dyer MD;  Location: [facility] OR      Allergies   Allergen Reactions     Oxycodone Rash     Penicillins Rash      Social History     Tobacco Use     Smoking status: Former     Types: Cigarettes     Quit date:      Years since quittin.3     Smokeless tobacco: Never   Vaping Use     Vaping status: Not on file   Substance Use Topics     Alcohol use: Not on file      Wt Readings from Last 1 Encounters:   23 79.4 kg (175 lb)        Anesthesia Evaluation            ROS/MED HX  ENT/Pulmonary:       Neurologic:       Cardiovascular:       METS/Exercise Tolerance:     Hematologic:     (+) History of blood clots,     Musculoskeletal:       GI/Hepatic:       Renal/Genitourinary:       Endo:     (+) type II DM, Obesity,     Psychiatric/Substance Use:       Infectious Disease:       Malignancy:       Other:            Physical Exam    Airway        Mallampati: II       Respiratory Devices and Support         Dental           Cardiovascular          Rhythm and rate: regular     Pulmonary           breath sounds clear to auscultation           OUTSIDE LABS:  CBC: No results found for: WBC, HGB, HCT, PLT  BMP:   Lab Results   Component Value Date     (H) 2023     (H) 2023     COAGS: No results found for: PTT, INR, FIBR  POC: No results found for: BGM, HCG,  HCGS  HEPATIC: No results found for: ALBUMIN, PROTTOTAL, ALT, AST, GGT, ALKPHOS, BILITOTAL, BILIDIRECT, JOHNATHON  OTHER: No results found for: PH, LACT, A1C, SCOOTER, PHOS, MAG, LIPASE, AMYLASE, TSH, T4, T3, CRP, SED    Anesthesia Plan    ASA Status:  2   NPO Status:  NPO Appropriate    Anesthesia Type: MAC.     - Reason for MAC: immobility needed, straight local not clinically adequate              Consents    Anesthesia Plan(s) and associated risks, benefits, and realistic alternatives discussed. Questions answered and patient/representative(s) expressed understanding.    - Discussed:     - Discussed with:  Patient         Postoperative Care            Comments:                Luke Cruz MD

## 2023-05-09 NOTE — ANESTHESIA CARE TRANSFER NOTE
Patient: Dottie Wilhelm    Procedure: Procedure(s):  RIGHT EYE PHACOEMULSIFICATION, CATARACT, WITH INTRAOCULAR LENS IMPLANT with trypan blue       Diagnosis: Mixed type age-related cataract, right eye [H25.811]  Diagnosis Additional Information: No value filed.    Anesthesia Type:   MAC     Note:    Oropharynx: oropharynx clear of all foreign objects and spontaneously breathing  Level of Consciousness: awake  Oxygen Supplementation: room air    Independent Airway: airway patency satisfactory and stable  Dentition: dentition unchanged  Vital Signs Stable: post-procedure vital signs reviewed and stable  Report to RN Given: handoff report given  Patient transferred to: PACU  Comments: Uneventful transport   Report to RN - Mert  Pt comfortable  Exchanging well; color natl  Pt responds appropriately to command  IV patent  Lips/teeth/dentition as preop status  Questions answered    Handoff Report: Identifed the Patient, Identified the Reponsible Provider, Reviewed the pertinent medical history, Discussed the surgical course, Reviewed Intra-OP anesthesia mangement and issues during anesthesia, Set expectations for post-procedure period and Allowed opportunity for questions and acknowledgement of understanding      Vitals:  Vitals Value Taken Time   /53    Temp 98    Pulse 62    Resp 16    SpO2 96% room air 0918       Electronically Signed By: NI TOWNSEND CRNA  May 9, 2023  9:19 AM

## 2023-05-10 ENCOUNTER — OFFICE VISIT (OUTPATIENT)
Dept: OPHTHALMOLOGY | Facility: CLINIC | Age: 73
End: 2023-05-10
Attending: OPHTHALMOLOGY
Payer: COMMERCIAL

## 2023-05-10 DIAGNOSIS — Z48.810 AFTERCARE FOLLOWING SURGERY OF A SENSORY ORGAN: Primary | ICD-10-CM

## 2023-05-10 PROCEDURE — 99024 POSTOP FOLLOW-UP VISIT: CPT | Performed by: OPHTHALMOLOGY

## 2023-05-10 PROCEDURE — G0463 HOSPITAL OUTPT CLINIC VISIT: HCPCS | Performed by: OPHTHALMOLOGY

## 2023-05-10 ASSESSMENT — TONOMETRY
IOP_METHOD: ICARE
OS_IOP_MMHG: 10
OD_IOP_MMHG: 10

## 2023-05-10 ASSESSMENT — SLIT LAMP EXAM - LIDS
COMMENTS: NORMAL
COMMENTS: NORMAL

## 2023-05-10 ASSESSMENT — CUP TO DISC RATIO: OD_RATIO: 0.4

## 2023-05-10 ASSESSMENT — EXTERNAL EXAM - LEFT EYE: OS_EXAM: NORMAL

## 2023-05-10 ASSESSMENT — EXTERNAL EXAM - RIGHT EYE: OD_EXAM: NORMAL

## 2023-05-10 ASSESSMENT — VISUAL ACUITY
METHOD: SNELLEN - LINEAR
OD_SC: 20/25
OS_SC: 20/20-

## 2023-05-10 NOTE — PROGRESS NOTES
"CC -  status post Cataract extraction intraocular lens right eye 05/9/23 ; left eye 4.25.2023  Status post 25 g Pars plana vitrectomy (PPV)/ endolaser/ air fluid exchange left eye 01/17/23   Because of  Vitreous hemorrhage and rvo     Interval: POD1 status post Cataract extraction intraocular lens right eye; no eye pain; patient happy with vision   s/p left cataract surgery. Very happy with left eye vision; No eye pain. She sees one small floater from time to time, no flashes. Noticing a small \"light reflection\" in the left peripheral vision of the left eye only intermittently when there is a light source in an otherwise dark area, not bothersome at all per patient.        [facility] - Dottie Wilhelm is a 73 year old female here for evaluation of left eye vision loss. She was in the ED last night and was referred to us.     PAST MEDICAL HISTORY: DM2 - controlled w/ diet/excercise; PE (1975); DVT (2016) - on Xarelto   H/o osteomyelitis, left leg (1977); Breast cancer s/p radiation/chemo 2011  PAST OCULAR SURGERY: None, no laser in situ keratomileusis     RETINAL IMAGING:   Optical Coherence Tomography 02/15/23   Right eye: normal foveal contour  Left eye: good foveal contour; sup and temporal retina thinning      ASSESSMENT & PLAN  # status post Cataract extraction intraocular lens right eye 05/9/23 ; left eye 4.25.2023  Intraocular lens in good position  Doing well    #status post 25 g Pars plana vitrectomy (PPV)/ endolaser/ air fluid exchange left eye 01/17/23   Secondary to  To vitreous hemorrhage - RVO   Retina attached  Doing well    # H/o RVO left   - diagnosed 5/2017   - received one intravitreal injection    # DM2   - diagnosed 2018   - no BDR of the right eye   - A1c controlled (<7)    PLAN:   Right eye   Ketorolac (gray top) four times a day    Predforte  (pink top) four times a day  (shake the bottle before)  Ofloxacin (tan top) four times a day    Put the eyedrops 5 minutes a part    Left eye     Start to taper " "left eye Ketorolac (gray top)  twice a day x 1 week and then once a day till finish  Start to taper left eye Predforte  (pink top) twice a day x 1 week and then once a day till finish    Eye shield or glasses at all times x 3 weeks  Sleep with the shield  No heavy lifting   Follow-up in one week  Retina detachment and endophthalmitis precautions were discussed with the patient (increased blurry vision, drainage, new flashes, floaters or a curtain in the visual field) and was asked to return if any of the those occur    What to watch out for:  If you experience any of the following \"RSVP Symptoms\", you should call immediately:    Worsening Redness    Worsening Sensitivity to light    Worsening Vision, including new flashing lights or floaters    Worsening Pain, including nausea/vomiting    Follow up  POW1    ~~~~~~~~~~~~~~~~~~~~~~~~~~~~~~~~~~   Complete documentation of historical and exam elements from today's encounter can be found in the full encounter summary report (not reduplicated in this progress note).  I personally obtained the chief complaint(s) and history of present illness.  I confirmed and edited as necessary the review of systems, past medical/surgical history, family history, social history, and examination findings as documented by others; and I examined the patient myself.  I personally reviewed the relevant tests, images, and reports as documented above.  I formulated and edited as necessary the assessment and plan and discussed the findings and management plan with the patient and family    Sheyla Dyer MD  Professor of Ophthalmology  Vitreo-Retinal surgeon   Department of Ophthalmology and Visual Neurosciences   [facility]  Phone: [phone]   Fax: [phone]   "

## 2023-05-10 NOTE — PATIENT INSTRUCTIONS
"  Right eye   Ketorolac (gray top) four times a day    Predforte  (pink top) four times a day  (shake the bottle before)  Ofloxacin (tan top) four times a day    Put the eyedrops 5 minutes a part    Left eye     Start to taper left eye Ketorolac (gray top)  twice a day x 1 week and then once a day till finish  Start to taper left eye Predforte  (pink top) twice a day x 1 week and then once a day till finish    Eye shield or glasses at all times x 3 weeks  Sleep with the shield  No heavy lifting   Follow-up in one week  Retina detachment and endophthalmitis precautions were discussed with the patient (increased blurry vision, drainage, new flashes, floaters or a curtain in the visual field) and was asked to return if any of the those occur    What to watch out for:  If you experience any of the following \"RSVP Symptoms\", you should call immediately:  Worsening Redness  Worsening Sensitivity to light  Worsening Vision, including new flashing lights or floaters  Worsening Pain, including nausea/vomiting  "

## 2023-05-17 ENCOUNTER — OFFICE VISIT (OUTPATIENT)
Dept: OPHTHALMOLOGY | Facility: CLINIC | Age: 73
End: 2023-05-17
Attending: OPHTHALMOLOGY
Payer: COMMERCIAL

## 2023-05-17 DIAGNOSIS — Z48.810 AFTERCARE FOLLOWING SURGERY OF A SENSORY ORGAN: Primary | ICD-10-CM

## 2023-05-17 PROCEDURE — 99024 POSTOP FOLLOW-UP VISIT: CPT | Mod: GC | Performed by: OPHTHALMOLOGY

## 2023-05-17 PROCEDURE — G0463 HOSPITAL OUTPT CLINIC VISIT: HCPCS | Performed by: OPHTHALMOLOGY

## 2023-05-17 ASSESSMENT — TONOMETRY
IOP_METHOD: TONOPEN
OS_IOP_MMHG: 22
OD_IOP_MMHG: 18

## 2023-05-17 ASSESSMENT — SLIT LAMP EXAM - LIDS
COMMENTS: NORMAL
COMMENTS: NORMAL

## 2023-05-17 ASSESSMENT — EXTERNAL EXAM - RIGHT EYE: OD_EXAM: NORMAL

## 2023-05-17 ASSESSMENT — EXTERNAL EXAM - LEFT EYE: OS_EXAM: NORMAL

## 2023-05-17 ASSESSMENT — VISUAL ACUITY
METHOD: SNELLEN - LINEAR
OD_SC: 20/20
OS_SC: 20/20

## 2023-05-17 ASSESSMENT — CUP TO DISC RATIO: OD_RATIO: 0.4

## 2023-05-17 NOTE — NURSING NOTE
Chief Complaints and History of Present Illnesses   Patient presents with     Post Op (Ophthalmology) Right Eye     Chief Complaint(s) and History of Present Illness(es)     Post Op (Ophthalmology) Right Eye            Laterality: right eye    Onset: gradual    Onset: months ago    Quality: States the va is great      Associated symptoms: Negative for dryness, redness, tearing, flashes and floaters    Treatments tried: eye drops    Pain scale: 0/10          Comments    status post Cataract extraction intraocular lens right eye 05/9/23   Predforte  QID  right eye  and left eye BID   Ketorolac QID right eye  and left eye BID   Ofloxacin QID right eye   Judie Gunn COT 2:50 PM May 17, 2023

## 2023-05-17 NOTE — PATIENT INSTRUCTIONS
"Right eye   Start to taper left eye Ketorolac (gray top)  Three times a day x 1 week, then 2x day for 1 week and then once a day for 1 week, then stop  Start to taper left eye Predforte  (pink top) Three times a day x 1 week, then 2x day for 1 week and then once a day for 1 week, then stop  Ofloxacin (tan top) STOP    Left eye     Start to taper left eye Ketorolac (gray top)  twice a day x 1 week and then once a day till finish  Start to taper left eye Predforte  (pink top) twice a day x 1 week and then once a day till finish    Eye shield or glasses at all times x 3 weeks  Retina detachment and endophthalmitis precautions were discussed with the patient (increased blurry vision, drainage, new flashes, floaters or a curtain in the visual field) and was asked to return if any of the those occur    What to watch out for:  If you experience any of the following \"RSVP Symptoms\", you should call immediately:  Worsening Redness  Worsening Sensitivity to light  Worsening Vision, including new flashing lights or floaters  Worsening Pain, including nausea/vomiting  "

## 2023-05-17 NOTE — PROGRESS NOTES
CC -  status post Cataract extraction intraocular lens right eye 05/9/23 ; left eye 4.25.2023  Status post 25 g Pars plana vitrectomy (PPV)/ endolaser/ air fluid exchange left eye 01/17/23   Because of  Vitreous hemorrhage and rvo     Interval: POW1 status post Cataract extraction intraocular lens right eye; reports no changes in vision    status post Cataract extraction intraocular lens right eye 05/9/23   Predforte  QID  right eye  and left eye BID   Ketorolac QID right eye  and left eye BID   Ofloxacin QID right eye        PM - Dottie Wilhelm is a 73 year old female here for evaluation of left eye vision loss. She was in the ED last night and was referred to us.     PAST MEDICAL HISTORY: DM2 - controlled w/ diet/excercise; PE (1975); DVT (2016) - on Xarelto   H/o osteomyelitis, left leg (1977); Breast cancer s/p radiation/chemo 2011  PAST OCULAR SURGERY: None, no laser in situ keratomileusis     RETINAL IMAGING:   Optical Coherence Tomography 02/15/23   Right eye: normal foveal contour  Left eye: good foveal contour; sup and temporal retina thinning      ASSESSMENT & PLAN  # status post Cataract extraction intraocular lens right eye 05/9/23 ; left eye 4.25.2023  Intraocular lens in good position  Doing well    #status post 25 g Pars plana vitrectomy (PPV)/ endolaser/ air fluid exchange left eye 01/17/23   Secondary to  To vitreous hemorrhage - RVO   Retina attached  Doing well    # H/o RVO left   - diagnosed 5/2017   - received one intravitreal injection    # DM2   - diagnosed 2018   - no BDR of the right eye   - A1c controlled (<7)    PLAN:   Right eye   Start to taper left eye Ketorolac (gray top)  Three times a day x 1 week, then 2x day for 1 week and then once a day for 1 week, then stop  Start to taper left eye Predforte  (pink top) Three times a day x 1 week, then 2x day for 1 week and then once a day for 1 week, then stop  Ofloxacin (tan top) STOP    Left eye     Start to taper left eye Ketorolac (gray  "top)  twice a day x 1 week and then once a day till finish  Start to taper left eye Predforte  (pink top) twice a day x 1 week and then once a day till finish    Eye shield or glasses at all times x 3 weeks  Retina detachment and endophthalmitis precautions were discussed with the patient (increased blurry vision, drainage, new flashes, floaters or a curtain in the visual field) and was asked to return if any of the those occur    What to watch out for:  If you experience any of the following \"RSVP Symptoms\", you should call immediately:    Worsening Redness    Worsening Sensitivity to light    Worsening Vision, including new flashing lights or floaters    Worsening Pain, including nausea/vomiting    Follow up 3-4 weeks POM1 refraction OU; DFE OD    Librado Leonard MD MPH  Vitreoretinal Fellow PGY-5  [facility]     ~~~~~~~~~~~~~~~~~~~~~~~~~~~~~~~~~~   Complete documentation of historical and exam elements from today's encounter can be found in the full encounter summary report (not reduplicated in this progress note).  I personally obtained the chief complaint(s) and history of present illness.  I confirmed and edited as necessary the review of systems, past medical/surgical history, family history, social history, and examination findings as documented by others; and I examined the patient myself.  I personally reviewed the relevant tests, images, and reports as documented above.  I formulated and edited as necessary the assessment and plan and discussed the findings and management plan with the patient and family    Sheyla Dyer MD  Professor of Ophthalmology  Vitreo-Retinal surgeon   Department of Ophthalmology and Visual Neurosciences   [facility]  Phone: [phone]   Fax: [phone]   "

## 2023-05-27 ENCOUNTER — HEALTH MAINTENANCE LETTER (OUTPATIENT)
Age: 73
End: 2023-05-27

## 2023-06-07 ENCOUNTER — OFFICE VISIT (OUTPATIENT)
Dept: OPHTHALMOLOGY | Facility: CLINIC | Age: 73
End: 2023-06-07
Attending: OPHTHALMOLOGY
Payer: COMMERCIAL

## 2023-06-07 DIAGNOSIS — Z48.810 AFTERCARE FOLLOWING SURGERY OF A SENSORY ORGAN: Primary | ICD-10-CM

## 2023-06-07 PROCEDURE — G0463 HOSPITAL OUTPT CLINIC VISIT: HCPCS | Performed by: OPHTHALMOLOGY

## 2023-06-07 PROCEDURE — 99024 POSTOP FOLLOW-UP VISIT: CPT | Performed by: OPHTHALMOLOGY

## 2023-06-07 ASSESSMENT — EXTERNAL EXAM - RIGHT EYE: OD_EXAM: NORMAL

## 2023-06-07 ASSESSMENT — TONOMETRY
OD_IOP_MMHG: 15
IOP_METHOD: TONOPEN
OS_IOP_MMHG: 14

## 2023-06-07 ASSESSMENT — VISUAL ACUITY
OS_SC: 20/15
OD_SC: 20/15
METHOD: SNELLEN - LINEAR

## 2023-06-07 ASSESSMENT — REFRACTION_MANIFEST
OD_SPHERE: PLANO
OD_ADD: +2.75
OS_SPHERE: PLANO
OS_ADD: +2.75

## 2023-06-07 ASSESSMENT — EXTERNAL EXAM - LEFT EYE: OS_EXAM: NORMAL

## 2023-06-07 ASSESSMENT — SLIT LAMP EXAM - LIDS
COMMENTS: NORMAL
COMMENTS: NORMAL

## 2023-06-07 ASSESSMENT — CUP TO DISC RATIO: OD_RATIO: 0.4

## 2023-06-07 NOTE — NURSING NOTE
Chief Complaints and History of Present Illnesses   Patient presents with     Post Op (Ophthalmology) Both Eyes     status post Cataract extraction intraocular lens right eye 05/9/23 ; left eye 4.25.2023  Doing good, vision is great  Done w drops  Xenia Dziuk COA 2:34 PM June 7, 2023        Chief Complaint(s) and History of Present Illness(es)     Post Op (Ophthalmology) Both Eyes            Laterality: both eyes    Comments: status post Cataract extraction intraocular lens right eye 05/9/23 ; left eye 4.25.2023  Doing good, vision is great  Done w drops  Xenia Dziuk COA 2:34 PM June 7, 2023

## 2023-06-07 NOTE — PROGRESS NOTES
"CC -  status post Cataract extraction intraocular lens right eye 05/9/23 ; left eye 4.25.2023  Status post 25 g Pars plana vitrectomy (PPV)/ endolaser/ air fluid exchange left eye 01/17/23   Because of  Vitreous hemorrhage and rvo     Interval: POW1 status post Cataract extraction intraocular lens right eye; reports no changes in vision    status post Cataract extraction intraocular lens right eye 05/9/23   Predforte  QID  right eye  and left eye BID   Ketorolac QID right eye  and left eye BID   Ofloxacin QID right eye        PM[facility] - Dottie Wilhelm is a 73 year old female here for evaluation of left eye vision loss. She was in the ED last night and was referred to us.     PAST MEDICAL HISTORY: DM2 - controlled w/ diet/excercise; PE (1975); DVT (2016) - on Xarelto   H/o osteomyelitis, left leg (1977); Breast cancer s/p radiation/chemo 2011  PAST OCULAR SURGERY: None, no laser in situ keratomileusis     RETINAL IMAGING:   Optical Coherence Tomography 02/15/23   Right eye: normal foveal contour  Left eye: good foveal contour; sup and temporal retina thinning      ASSESSMENT & PLAN  # status post Cataract extraction intraocular lens right eye 05/9/23 ; left eye 4.25.2023  Intraocular lens in good position  Doing well    #status post 25 g Pars plana vitrectomy (PPV)/ endolaser/ air fluid exchange left eye 01/17/23   Secondary to  To vitreous hemorrhage - RVO   Retina attached  Doing well    # H/o RVO left   - diagnosed 5/2017   - received one intravitreal injection    # DM2   - diagnosed 2018   - no BDR of the right eye   - A1c controlled (<7)    PLAN:   artificial tears  As needed   Retina detachment and endophthalmitis precautions were discussed with the patient (increased blurry vision, drainage, new flashes, floaters or a curtain in the visual field) and was asked to return if any of the those occur    What to watch out for:  If you experience any of the following \"RSVP Symptoms\", you should call " immediately:    Worsening Redness    Worsening Sensitivity to light    Worsening Vision, including new flashing lights or floaters    Worsening Pain, including nausea/vomiting    Follow up 8 weeks ; DFE OD    ~~~~~~~~~~~~~~~~~~~~~~~~~~~~~~~~~~   Complete documentation of historical and exam elements from today's encounter can be found in the full encounter summary report (not reduplicated in this progress note).  I personally obtained the chief complaint(s) and history of present illness.  I confirmed and edited as necessary the review of systems, past medical/surgical history, family history, social history, and examination findings as documented by others; and I examined the patient myself.  I personally reviewed the relevant tests, images, and reports as documented above.  I formulated and edited as necessary the assessment and plan and discussed the findings and management plan with the patient and family    Sheyla Dyer MD  Professor of Ophthalmology  Vitreo-Retinal surgeon   Department of Ophthalmology and Visual Neurosciences   [facility]  Phone: [phone]   Fax: [phone]

## 2023-09-07 ENCOUNTER — OFFICE VISIT (OUTPATIENT)
Dept: OPHTHALMOLOGY | Facility: CLINIC | Age: 73
End: 2023-09-07
Attending: OPHTHALMOLOGY
Payer: COMMERCIAL

## 2023-09-07 DIAGNOSIS — Z96.1 PSEUDOPHAKIA: Primary | ICD-10-CM

## 2023-09-07 PROCEDURE — G0463 HOSPITAL OUTPT CLINIC VISIT: HCPCS | Performed by: OPHTHALMOLOGY

## 2023-09-07 PROCEDURE — 92014 COMPRE OPH EXAM EST PT 1/>: CPT | Mod: GC | Performed by: OPHTHALMOLOGY

## 2023-09-07 ASSESSMENT — REFRACTION_WEARINGRX
OD_ADD: +2.75
OS_ADD: +2.75
OS_SPHERE: PLANO
OD_SPHERE: PLANO

## 2023-09-07 ASSESSMENT — CONF VISUAL FIELD
OS_INFERIOR_TEMPORAL_RESTRICTION: 0
OS_SUPERIOR_TEMPORAL_RESTRICTION: 0
OD_INFERIOR_TEMPORAL_RESTRICTION: 0
OS_INFERIOR_NASAL_RESTRICTION: 0
OD_SUPERIOR_TEMPORAL_RESTRICTION: 0
METHOD: COUNTING FINGERS
OD_SUPERIOR_NASAL_RESTRICTION: 0
OS_NORMAL: 1
OD_INFERIOR_NASAL_RESTRICTION: 0
OS_SUPERIOR_NASAL_RESTRICTION: 0
OD_NORMAL: 1

## 2023-09-07 ASSESSMENT — EXTERNAL EXAM - RIGHT EYE: OD_EXAM: NORMAL

## 2023-09-07 ASSESSMENT — VISUAL ACUITY
OS_SC: 20/20
METHOD: SNELLEN - LINEAR
OD_CC: J1+
OD_SC: 20/20
OS_CC: J1+

## 2023-09-07 ASSESSMENT — TONOMETRY
OS_IOP_MMHG: 11
OD_IOP_MMHG: 12
IOP_METHOD: TONOPEN

## 2023-09-07 ASSESSMENT — SLIT LAMP EXAM - LIDS
COMMENTS: NORMAL
COMMENTS: NORMAL

## 2023-09-07 ASSESSMENT — EXTERNAL EXAM - LEFT EYE: OS_EXAM: NORMAL

## 2023-09-07 ASSESSMENT — CUP TO DISC RATIO: OD_RATIO: 0.4

## 2023-09-07 NOTE — NURSING NOTE
"Chief Complaints and History of Present Illnesses   Patient presents with    Pseudophakia Follow Up     Chief Complaint(s) and History of Present Illness(es)       Pseudophakia Follow Up              Laterality: right eye    Associated symptoms: Negative for blurred vision    Frequency: constant    Pain scale: 0/10              Comments    Dottie is here to follow up on RE post cataract surgery \"both eyes\". She feels vision is good. Denies flashes or floaters.     Wilian Esqueda COT 2:08 PM September 7, 2023                      "

## 2023-09-07 NOTE — PROGRESS NOTES
CC -  status post Cataract extraction intraocular lens right eye 05/9/23 ; left eye 4.25.2023  Status post 25 g Pars plana vitrectomy (PPV)/ endolaser/ air fluid exchange left eye 01/17/23   Because of  Vitreous hemorrhage and rvo     Interval: No new floaters, flashes of light, no pain in the eyes, off all drops    PM[facility] - Dottie Wilhelm is a 73 year old female here for evaluation of left eye vision loss. She was in the ED last night and was referred to us.     PAST MEDICAL HISTORY: DM2 - controlled w/ diet/excercise; PE (1975); DVT (2016) - on Xarelto   H/o osteomyelitis, left leg (1977); Breast cancer s/p radiation/chemo 2011  PAST OCULAR SURGERY: None, no laser in situ keratomileusis     RETINAL IMAGING:   Optical Coherence Tomography 02/15/23   Right eye: normal foveal contour  Left eye: good foveal contour; sup and temporal retina thinning      ASSESSMENT & PLAN  # status post Cataract extraction intraocular lens right eye 05/9/23 ; left eye 4.25.2023  Intraocular lens in good position  Doing well    #status post 25 g Pars plana vitrectomy (PPV)/ endolaser/ air fluid exchange left eye 01/17/23   Secondary to  To vitreous hemorrhage - RVO   Doing well    # H/o RVO left   - diagnosed 5/2017   - received one intravitreal injection    # DM2   - diagnosed 2018   - no BDR of the right eye   - A1c controlled (<7)    PLAN:   Follow up 6 months DFE, OCT mac  Retina detachment and endophthalmitis precautions were discussed with the patient (increased blurry vision, drainage, new flashes, floaters or a curtain in the visual field) and was asked to return if any of the those occur    Thank you for entrusting us with your care  Maria De Jesus Sterling MD, PGY3  Ophthalmology Resident  [facility]      ~~~~~~~~~~~~~~~~~~~~~~~~~~~~~~~~~~   Complete documentation of historical and exam elements from today's encounter can be found in the full encounter summary report (not reduplicated in this progress note).  I personally  obtained the chief complaint(s) and history of present illness.  I confirmed and edited as necessary the review of systems, past medical/surgical history, family history, social history, and examination findings as documented by others; and I examined the patient myself.  I personally reviewed the relevant tests, images, and reports as documented above.  I formulated and edited as necessary the assessment and plan and discussed the findings and management plan with the patient and family    Sheyla Dyer MD  Professor of Ophthalmology  Vitreo-Retinal surgeon   Department of Ophthalmology and Visual Neurosciences   [facility]  Phone: [phone]   Fax: [phone]

## 2023-10-29 ENCOUNTER — HEALTH MAINTENANCE LETTER (OUTPATIENT)
Age: 73
End: 2023-10-29

## 2024-03-17 ENCOUNTER — HEALTH MAINTENANCE LETTER (OUTPATIENT)
Age: 74
End: 2024-03-17

## 2024-08-04 ENCOUNTER — HEALTH MAINTENANCE LETTER (OUTPATIENT)
Age: 74
End: 2024-08-04

## 2024-10-13 ENCOUNTER — HEALTH MAINTENANCE LETTER (OUTPATIENT)
Age: 74
End: 2024-10-13

## 2024-12-21 ENCOUNTER — HEALTH MAINTENANCE LETTER (OUTPATIENT)
Age: 74
End: 2024-12-21

## 2025-03-22 ENCOUNTER — HEALTH MAINTENANCE LETTER (OUTPATIENT)
Age: 75
End: 2025-03-22

## 2025-07-05 ENCOUNTER — HEALTH MAINTENANCE LETTER (OUTPATIENT)
Age: 75
End: 2025-07-05

## 2025-08-16 ENCOUNTER — HEALTH MAINTENANCE LETTER (OUTPATIENT)
Age: 75
End: 2025-08-16

## (undated) DEVICE — EYE SHIELD PLASTIC

## (undated) DEVICE — PACK CATARACT CUSTOM ASC SEY15CPUMC

## (undated) DEVICE — EYE TIP IRRIGATION & ASPIRATION POLYMER 35D BENT 8065751511

## (undated) DEVICE — EYE PACK CUSTOM ANTERIOR 30DEG TIP CENTURION PPK6682-04

## (undated) DEVICE — EYE CANN IRR 25GA CYSTOTOME 581610

## (undated) DEVICE — TAPE MICROPORE 1"X1.5YD 1530S-1

## (undated) DEVICE — GLOVE BIOGEL PI MICRO SZ 6.0 48560

## (undated) DEVICE — EYE KNIFE STILETTO VISITEC 1.1MM ANG 45DEG SIDEPORT 376620

## (undated) DEVICE — EYE KNIFE SLIT XSTAR VISITEC 2.5MM 45DEG BEVEL UP 373725

## (undated) DEVICE — EYE CANN IRR 27GA ANTERIOR CHAMBER 581280

## (undated) DEVICE — EYE NDL RETROBULBAR ATKINSON 25GA 1.5" 581637

## (undated) DEVICE — LINEN TOWEL PACK X5 5464

## (undated) DEVICE — SOL WATER IRRIG 500ML BOTTLE 2F7113

## (undated) DEVICE — TAPE MICROPORE 2"X1.5YD 1530S-2

## (undated) RX ORDER — PROPOFOL 10 MG/ML
INJECTION, EMULSION INTRAVENOUS
Status: DISPENSED
Start: 2023-05-09

## (undated) RX ORDER — ONDANSETRON 2 MG/ML
INJECTION INTRAMUSCULAR; INTRAVENOUS
Status: DISPENSED
Start: 2023-05-09

## (undated) RX ORDER — PROPOFOL 10 MG/ML
INJECTION, EMULSION INTRAVENOUS
Status: DISPENSED
Start: 2023-04-25

## (undated) RX ORDER — ACETAMINOPHEN 325 MG/1
TABLET ORAL
Status: DISPENSED
Start: 2023-05-09

## (undated) RX ORDER — FENTANYL CITRATE 50 UG/ML
INJECTION, SOLUTION INTRAMUSCULAR; INTRAVENOUS
Status: DISPENSED
Start: 2023-04-25